# Patient Record
Sex: FEMALE | Race: WHITE | NOT HISPANIC OR LATINO | Employment: FULL TIME | URBAN - METROPOLITAN AREA
[De-identification: names, ages, dates, MRNs, and addresses within clinical notes are randomized per-mention and may not be internally consistent; named-entity substitution may affect disease eponyms.]

---

## 2023-05-10 ENCOUNTER — OFFICE VISIT (OUTPATIENT)
Dept: PRIMARY CARE | Facility: CLINIC | Age: 43
End: 2023-05-10
Payer: COMMERCIAL

## 2023-05-10 ENCOUNTER — LAB (OUTPATIENT)
Dept: LAB | Facility: LAB | Age: 43
End: 2023-05-10
Payer: COMMERCIAL

## 2023-05-10 VITALS
BODY MASS INDEX: 29.06 KG/M2 | WEIGHT: 203 LBS | SYSTOLIC BLOOD PRESSURE: 122 MMHG | OXYGEN SATURATION: 96 % | HEIGHT: 70 IN | HEART RATE: 73 BPM | DIASTOLIC BLOOD PRESSURE: 86 MMHG

## 2023-05-10 DIAGNOSIS — F32.89 OTHER DEPRESSION: ICD-10-CM

## 2023-05-10 DIAGNOSIS — F32.89 OTHER DEPRESSION: Primary | ICD-10-CM

## 2023-05-10 DIAGNOSIS — Z13.6 SCREENING FOR CARDIOVASCULAR CONDITION: ICD-10-CM

## 2023-05-10 DIAGNOSIS — N32.81 OVERACTIVE BLADDER: ICD-10-CM

## 2023-05-10 DIAGNOSIS — K21.00 GASTROESOPHAGEAL REFLUX DISEASE WITH ESOPHAGITIS WITHOUT HEMORRHAGE: ICD-10-CM

## 2023-05-10 DIAGNOSIS — N32.81 OAB (OVERACTIVE BLADDER): ICD-10-CM

## 2023-05-10 PROBLEM — G43.909 HEADACHE, MIGRAINE: Status: ACTIVE | Noted: 2023-05-10

## 2023-05-10 PROBLEM — E03.9 HYPOTHYROIDISM: Status: ACTIVE | Noted: 2023-05-10

## 2023-05-10 PROBLEM — S93.401A SPRAIN OF ANKLE, RIGHT: Status: RESOLVED | Noted: 2023-05-10 | Resolved: 2023-05-10

## 2023-05-10 PROBLEM — G89.29 CHRONIC LOW BACK PAIN: Status: RESOLVED | Noted: 2023-05-10 | Resolved: 2023-05-10

## 2023-05-10 PROBLEM — A60.00 RECURRENT GENITAL HERPES: Status: ACTIVE | Noted: 2023-05-10

## 2023-05-10 PROBLEM — E66.811 OBESITY (BMI 30.0-34.9): Status: RESOLVED | Noted: 2023-05-10 | Resolved: 2023-05-10

## 2023-05-10 PROBLEM — M62.08 RECTUS DIASTASIS: Status: ACTIVE | Noted: 2023-05-10

## 2023-05-10 PROBLEM — R35.0 URINARY FREQUENCY: Status: RESOLVED | Noted: 2023-05-10 | Resolved: 2023-05-10

## 2023-05-10 PROBLEM — M79.671 PAIN IN BOTH FEET: Status: RESOLVED | Noted: 2023-05-10 | Resolved: 2023-05-10

## 2023-05-10 PROBLEM — J01.90 ACUTE SINUSITIS: Status: RESOLVED | Noted: 2023-05-10 | Resolved: 2023-05-10

## 2023-05-10 PROBLEM — M79.672 PAIN IN BOTH FEET: Status: RESOLVED | Noted: 2023-05-10 | Resolved: 2023-05-10

## 2023-05-10 PROBLEM — M54.50 CHRONIC LOW BACK PAIN: Status: RESOLVED | Noted: 2023-05-10 | Resolved: 2023-05-10

## 2023-05-10 PROBLEM — R19.7 DIARRHEA: Status: RESOLVED | Noted: 2023-05-10 | Resolved: 2023-05-10

## 2023-05-10 PROBLEM — K42.9 UMBILICAL HERNIA: Status: RESOLVED | Noted: 2023-05-10 | Resolved: 2023-05-10

## 2023-05-10 PROBLEM — E66.9 OBESITY (BMI 30.0-34.9): Status: RESOLVED | Noted: 2023-05-10 | Resolved: 2023-05-10

## 2023-05-10 PROBLEM — G56.03 BILATERAL CARPAL TUNNEL SYNDROME: Status: RESOLVED | Noted: 2023-05-10 | Resolved: 2023-05-10

## 2023-05-10 PROBLEM — M72.2 PLANTAR FASCIITIS, BILATERAL: Status: RESOLVED | Noted: 2023-05-10 | Resolved: 2023-05-10

## 2023-05-10 PROBLEM — J30.9 ALLERGIC RHINITIS: Status: RESOLVED | Noted: 2023-05-10 | Resolved: 2023-05-10

## 2023-05-10 PROBLEM — K21.9 GERD (GASTROESOPHAGEAL REFLUX DISEASE): Status: ACTIVE | Noted: 2023-05-10

## 2023-05-10 PROBLEM — F32.A DEPRESSION: Status: ACTIVE | Noted: 2023-05-10

## 2023-05-10 LAB
ALANINE AMINOTRANSFERASE (SGPT) (U/L) IN SER/PLAS: 35 U/L (ref 7–45)
ALBUMIN (G/DL) IN SER/PLAS: 4 G/DL (ref 3.4–5)
ALKALINE PHOSPHATASE (U/L) IN SER/PLAS: 72 U/L (ref 33–110)
ANION GAP IN SER/PLAS: 14 MMOL/L (ref 10–20)
ASPARTATE AMINOTRANSFERASE (SGOT) (U/L) IN SER/PLAS: 26 U/L (ref 9–39)
BILIRUBIN TOTAL (MG/DL) IN SER/PLAS: 0.5 MG/DL (ref 0–1.2)
CALCIUM (MG/DL) IN SER/PLAS: 9.3 MG/DL (ref 8.6–10.3)
CARBON DIOXIDE, TOTAL (MMOL/L) IN SER/PLAS: 26 MMOL/L (ref 21–32)
CHLORIDE (MMOL/L) IN SER/PLAS: 102 MMOL/L (ref 98–107)
CHOLESTEROL (MG/DL) IN SER/PLAS: 175 MG/DL (ref 0–199)
CHOLESTEROL IN HDL (MG/DL) IN SER/PLAS: 55.3 MG/DL
CHOLESTEROL/HDL RATIO: 3.2
CREATININE (MG/DL) IN SER/PLAS: 0.78 MG/DL (ref 0.5–1.05)
ERYTHROCYTE DISTRIBUTION WIDTH (RATIO) BY AUTOMATED COUNT: 13.7 % (ref 11.5–14.5)
ERYTHROCYTE MEAN CORPUSCULAR HEMOGLOBIN CONCENTRATION (G/DL) BY AUTOMATED: 33 G/DL (ref 32–36)
ERYTHROCYTE MEAN CORPUSCULAR VOLUME (FL) BY AUTOMATED COUNT: 86 FL (ref 80–100)
ERYTHROCYTES (10*6/UL) IN BLOOD BY AUTOMATED COUNT: 5.25 X10E12/L (ref 4–5.2)
GFR FEMALE: >90 ML/MIN/1.73M2
GLUCOSE (MG/DL) IN SER/PLAS: 82 MG/DL (ref 74–99)
HEMATOCRIT (%) IN BLOOD BY AUTOMATED COUNT: 45.2 % (ref 36–46)
HEMOGLOBIN (G/DL) IN BLOOD: 14.9 G/DL (ref 12–16)
LDL: 92 MG/DL (ref 0–99)
LEUKOCYTES (10*3/UL) IN BLOOD BY AUTOMATED COUNT: 8.2 X10E9/L (ref 4.4–11.3)
PLATELETS (10*3/UL) IN BLOOD AUTOMATED COUNT: 259 X10E9/L (ref 150–450)
POTASSIUM (MMOL/L) IN SER/PLAS: 4.3 MMOL/L (ref 3.5–5.3)
PROTEIN TOTAL: 6.6 G/DL (ref 6.4–8.2)
SODIUM (MMOL/L) IN SER/PLAS: 138 MMOL/L (ref 136–145)
THYROTROPIN (MIU/L) IN SER/PLAS BY DETECTION LIMIT <= 0.05 MIU/L: 1.61 MIU/L (ref 0.44–3.98)
TRIGLYCERIDE (MG/DL) IN SER/PLAS: 137 MG/DL (ref 0–149)
UREA NITROGEN (MG/DL) IN SER/PLAS: 8 MG/DL (ref 6–23)
VLDL: 27 MG/DL (ref 0–40)

## 2023-05-10 PROCEDURE — 82306 VITAMIN D 25 HYDROXY: CPT

## 2023-05-10 PROCEDURE — 84443 ASSAY THYROID STIM HORMONE: CPT

## 2023-05-10 PROCEDURE — 82607 VITAMIN B-12: CPT

## 2023-05-10 PROCEDURE — 80053 COMPREHEN METABOLIC PANEL: CPT

## 2023-05-10 PROCEDURE — 80061 LIPID PANEL: CPT

## 2023-05-10 PROCEDURE — 1036F TOBACCO NON-USER: CPT | Performed by: NURSE PRACTITIONER

## 2023-05-10 PROCEDURE — 85027 COMPLETE CBC AUTOMATED: CPT

## 2023-05-10 PROCEDURE — 99214 OFFICE O/P EST MOD 30 MIN: CPT | Performed by: NURSE PRACTITIONER

## 2023-05-10 PROCEDURE — 36415 COLL VENOUS BLD VENIPUNCTURE: CPT

## 2023-05-10 RX ORDER — PANTOPRAZOLE SODIUM 40 MG/1
1 TABLET, DELAYED RELEASE ORAL DAILY
COMMUNITY
Start: 2020-12-17 | End: 2023-05-10 | Stop reason: SDUPTHER

## 2023-05-10 RX ORDER — CITALOPRAM 40 MG/1
1 TABLET, FILM COATED ORAL DAILY
COMMUNITY
Start: 2021-11-29 | End: 2023-05-10 | Stop reason: SDDI

## 2023-05-10 RX ORDER — CITALOPRAM 10 MG/1
10 TABLET ORAL DAILY
Qty: 30 TABLET | Refills: 1 | Status: SHIPPED | OUTPATIENT
Start: 2023-05-10 | End: 2023-08-03 | Stop reason: SDUPTHER

## 2023-05-10 RX ORDER — OXYBUTYNIN CHLORIDE 10 MG/1
1 TABLET, EXTENDED RELEASE ORAL DAILY
COMMUNITY
Start: 2020-09-14 | End: 2023-05-10 | Stop reason: SDUPTHER

## 2023-05-10 RX ORDER — OXYBUTYNIN CHLORIDE 10 MG/1
10 TABLET, EXTENDED RELEASE ORAL DAILY
Qty: 90 TABLET | Refills: 3 | Status: SHIPPED | OUTPATIENT
Start: 2023-05-10 | End: 2023-11-15 | Stop reason: SDUPTHER

## 2023-05-10 RX ORDER — LEVOTHYROXINE SODIUM 50 UG/1
1 TABLET ORAL DAILY
COMMUNITY
Start: 2018-01-16

## 2023-05-10 RX ORDER — ALBUTEROL SULFATE 90 UG/1
2 AEROSOL, METERED RESPIRATORY (INHALATION) EVERY 6 HOURS PRN
COMMUNITY
Start: 2020-05-04

## 2023-05-10 RX ORDER — FLUTICASONE PROPIONATE 50 MCG
SPRAY, SUSPENSION (ML) NASAL 2 TIMES DAILY
COMMUNITY
Start: 2021-09-08

## 2023-05-10 RX ORDER — PANTOPRAZOLE SODIUM 20 MG/1
20 TABLET, DELAYED RELEASE ORAL DAILY
Qty: 90 TABLET | Refills: 3 | Status: SHIPPED | OUTPATIENT
Start: 2023-05-10 | End: 2024-05-09

## 2023-05-10 ASSESSMENT — PATIENT HEALTH QUESTIONNAIRE - PHQ9
2. FEELING DOWN, DEPRESSED OR HOPELESS: NOT AT ALL
SUM OF ALL RESPONSES TO PHQ9 QUESTIONS 1 AND 2: 0
1. LITTLE INTEREST OR PLEASURE IN DOING THINGS: NOT AT ALL

## 2023-05-10 NOTE — ASSESSMENT & PLAN NOTE
Improved but would like to start low dose of citalopram   Citalopram 10 mg  Cbc,cmp, lipid, vitamin d, vitamin b

## 2023-05-10 NOTE — PATIENT INSTRUCTIONS
I refilled your medications  We will start the celexa at a lower dose - if you want to increase after 6 weeks let me know  I ordered an EGD to check your stomach and esophagus

## 2023-05-10 NOTE — PROGRESS NOTES
"Subjective   Autumn Hyde is a 42 y.o. female who presents for Med Refill (Patient has been out of all medication x 6 months/Patient chokes when eating if not eating mints after eating).    Presents to follow up with medication refills    She has been out of medication x 6 months    She felt her depression did improve but would like to go back on citalopram at a lower dose    GERD has been greatly worse.  Feels like she is choking when she eats or drinks.  Noticed prior to stopping pantoprazole but now has gotten worse.  Has had EGD in the past with Dr. Sánchez - not sure when it was         ROS was completed and all systems are negative with the exception of what was noted in the the HPI.     Objective     Ht 1.778 m (5' 10\")   Wt 92.1 kg (203 lb)   BMI 29.13 kg/m²      Physical Exam  Vitals reviewed.   Constitutional:       Appearance: Normal appearance.   HENT:      Head: Normocephalic and atraumatic.      Right Ear: External ear normal.      Left Ear: External ear normal.      Nose: Nose normal.      Mouth/Throat:      Mouth: Mucous membranes are moist.   Eyes:      Extraocular Movements: Extraocular movements intact.      Conjunctiva/sclera: Conjunctivae normal.      Pupils: Pupils are equal, round, and reactive to light.   Cardiovascular:      Rate and Rhythm: Normal rate and regular rhythm.      Pulses: Normal pulses.      Heart sounds: Normal heart sounds.   Pulmonary:      Effort: Pulmonary effort is normal.      Breath sounds: Normal breath sounds.   Abdominal:      General: Bowel sounds are normal. There is no distension.      Palpations: Abdomen is soft.   Musculoskeletal:      Right lower leg: No edema.      Left lower leg: No edema.   Skin:     General: Skin is warm.      Capillary Refill: Capillary refill takes less than 2 seconds.   Neurological:      General: No focal deficit present.      Mental Status: She is alert and oriented to person, place, and time. Mental status is at baseline. "   Psychiatric:         Mood and Affect: Mood normal.         Behavior: Behavior normal.         Thought Content: Thought content normal.         Judgment: Judgment normal.         Problem List Items Addressed This Visit          Digestive    GERD (gastroesophageal reflux disease)    Overview     Last EGD 2020  GERD worse  Has been out of pantoprazole 6 months  Start pantoprazole 20 mg daily  EGD ordered          Current Assessment & Plan     Last EGD 2020  GERD worse  Has been out of pantoprazole 6 months  Start pantoprazole 20 mg daily  EGD ordered          Relevant Medications    pantoprazole (ProtoNix) 20 mg EC tablet    Other Relevant Orders    EGD       Genitourinary    OAB (overactive bladder)    Overview     Refilled oxybutynin          Current Assessment & Plan     Refilled oxybutynin          Relevant Medications    oxybutynin XL (Ditropan-XL) 10 mg 24 hr tablet       Other    Depression - Primary    Overview     Improved but would like to start low dose of citalopram   Citalopram 10 mg  Cbc,cmp, lipid, vitamin d, vitamin b         Current Assessment & Plan     Improved but would like to start low dose of citalopram   Citalopram 10 mg  Cbc,cmp, lipid, vitamin d, vitamin b         Relevant Medications    citalopram (CeleXA) 10 mg tablet    Other Relevant Orders    CBC    Comprehensive Metabolic Panel    TSH with reflex to Free T4 if abnormal    Vitamin B12    Vitamin D, Total     Other Visit Diagnoses       Screening for cardiovascular condition        Relevant Orders    Lipid Panel          .

## 2023-05-10 NOTE — ASSESSMENT & PLAN NOTE
Last EGD 2020  GERD worse  Has been out of pantoprazole 6 months  Start pantoprazole 20 mg daily  EGD ordered

## 2023-05-11 LAB
CALCIDIOL (25 OH VITAMIN D3) (NG/ML) IN SER/PLAS: 25 NG/ML
COBALAMIN (VITAMIN B12) (PG/ML) IN SER/PLAS: 277 PG/ML (ref 211–911)

## 2023-06-25 LAB — URINE CULTURE: NORMAL

## 2023-08-03 DIAGNOSIS — F32.89 OTHER DEPRESSION: ICD-10-CM

## 2023-08-03 RX ORDER — CITALOPRAM 10 MG/1
10 TABLET ORAL DAILY
Qty: 30 TABLET | Refills: 1 | Status: SHIPPED | OUTPATIENT
Start: 2023-08-03 | End: 2023-11-20

## 2023-08-16 ENCOUNTER — HOSPITAL ENCOUNTER (OUTPATIENT)
Dept: DATA CONVERSION | Facility: HOSPITAL | Age: 43
End: 2023-08-16
Attending: INTERNAL MEDICINE | Admitting: INTERNAL MEDICINE
Payer: COMMERCIAL

## 2023-08-16 DIAGNOSIS — K44.9 DIAPHRAGMATIC HERNIA WITHOUT OBSTRUCTION OR GANGRENE: ICD-10-CM

## 2023-08-16 DIAGNOSIS — R13.10 DYSPHAGIA, UNSPECIFIED: ICD-10-CM

## 2023-08-16 DIAGNOSIS — K21.00 GASTRO-ESOPHAGEAL REFLUX DISEASE WITH ESOPHAGITIS, WITHOUT BLEEDING: ICD-10-CM

## 2023-08-16 LAB — HCG, URINE: NORMAL

## 2023-08-18 LAB
COMPLETE PATHOLOGY REPORT: NORMAL
CONVERTED CLINICAL DIAGNOSIS-HISTORY: NORMAL
CONVERTED FINAL DIAGNOSIS: NORMAL
CONVERTED FINAL REPORT PDF LINK TO COPY AND PASTE: NORMAL
CONVERTED GROSS DESCRIPTION: NORMAL

## 2023-09-29 VITALS
SYSTOLIC BLOOD PRESSURE: 129 MMHG | TEMPERATURE: 96.8 F | DIASTOLIC BLOOD PRESSURE: 80 MMHG | HEART RATE: 57 BPM | RESPIRATION RATE: 16 BRPM

## 2023-09-30 NOTE — H&P
History of Present Illness:   Pregnant/Lactating:  ·  Are You Pregnant no   ·  Are You Currently Breastfeeding no     History Present Illness:  Reason for surgery: Dysphagia, GERD   HPI:    Patient was evaluated in GI clinic due to GERD, dysphagia.  Previous EGD  in 2020 by Dr. Singh grade A reflux esophagitis, mild gastritis.    Allergies:        Allergies:  ·  No Known Allergies :     Home Medication Review:   Home Medications Reviewed: yes     Impression/Procedure:   ·  Impression and Planned Procedure: EGD with biopsy       ERAS (Enhanced Recovery After Surgery):  ·  ERAS Patient: no       Vital Signs:  Temperature C: 36 degrees C   Temperature F: 96.8 degrees F   Heart Rate: 57 beats per minute   Respiratory Rate: 16 breath per minute   Blood Pressure Systolic: 129 mm/Hg   Blood Pressure Diastolic: 80 mm/Hg     Physical Exam by System:    Respiratory/Thorax: Patent airways, CTAB, normal  breath sounds with good chest expansion, thorax symmetric   Cardiovascular: Regular, rate and rhythm, no murmurs,  2+ equal pulses of the extremities, normal S 1and S 2     Consent:   COVID-19 Consent:  ·  COVID-19 Risk Consent Surgeon has reviewed key risks related to the risk of deniz COVID-19 and if they contract COVID-19 what the risks are.       Electronic Signatures:  Sylvester Hewitt)  (Signed 16-Aug-2023 08:15)   Authored: History of Present Illness, Allergies, Home  Medication Review, Impression/Procedure, ERAS, Physical Exam, Consent, Note Completion      Last Updated: 16-Aug-2023 08:15 by Sylvester Hewitt)

## 2023-10-30 ENCOUNTER — PREP FOR PROCEDURE (OUTPATIENT)
Dept: ORTHOPEDIC SURGERY | Facility: CLINIC | Age: 43
End: 2023-10-30
Payer: COMMERCIAL

## 2023-10-30 DIAGNOSIS — G56.02 CARPAL TUNNEL SYNDROME ON LEFT: Primary | ICD-10-CM

## 2023-10-30 NOTE — H&P
Patient doing well following right open carpal tunnel release.  Would like to proceed with left carpal tunnel release, as previously discussed.

## 2023-10-31 ENCOUNTER — TELEPHONE (OUTPATIENT)
Dept: ORTHOPEDIC SURGERY | Facility: CLINIC | Age: 43
End: 2023-10-31
Payer: COMMERCIAL

## 2023-11-03 PROBLEM — G56.02 CARPAL TUNNEL SYNDROME ON LEFT: Status: ACTIVE | Noted: 2023-10-30

## 2023-11-15 DIAGNOSIS — N32.81 OAB (OVERACTIVE BLADDER): Primary | ICD-10-CM

## 2023-11-15 RX ORDER — OXYBUTYNIN CHLORIDE 10 MG/1
10 TABLET, EXTENDED RELEASE ORAL DAILY
Qty: 90 TABLET | Refills: 0 | Status: SHIPPED | OUTPATIENT
Start: 2023-11-15 | End: 2024-04-02 | Stop reason: SDUPTHER

## 2023-11-17 DIAGNOSIS — F32.89 OTHER DEPRESSION: ICD-10-CM

## 2023-11-20 RX ORDER — CITALOPRAM 10 MG/1
10 TABLET ORAL DAILY
Qty: 30 TABLET | Refills: 0 | Status: SHIPPED | OUTPATIENT
Start: 2023-11-20

## 2023-11-30 DIAGNOSIS — J04.0 ACUTE LARYNGITIS: ICD-10-CM

## 2023-11-30 DIAGNOSIS — N39.0 RECURRENT URINARY TRACT INFECTION: Primary | ICD-10-CM

## 2023-11-30 RX ORDER — AMOXICILLIN AND CLAVULANATE POTASSIUM 875; 125 MG/1; MG/1
1 TABLET, FILM COATED ORAL 2 TIMES DAILY
Qty: 14 TABLET | Refills: 0 | Status: SHIPPED | OUTPATIENT
Start: 2023-11-30 | End: 2023-12-07

## 2023-11-30 RX ORDER — METHYLPREDNISOLONE 4 MG/1
TABLET ORAL
Qty: 21 TABLET | Refills: 0 | Status: SHIPPED | OUTPATIENT
Start: 2023-11-30 | End: 2023-12-07

## 2023-12-08 ENCOUNTER — TELEPHONE (OUTPATIENT)
Dept: UROLOGY | Facility: CLINIC | Age: 43
End: 2023-12-08
Payer: COMMERCIAL

## 2023-12-08 DIAGNOSIS — J06.9 UPPER RESPIRATORY TRACT INFECTION, UNSPECIFIED TYPE: Primary | ICD-10-CM

## 2023-12-08 RX ORDER — AMOXICILLIN AND CLAVULANATE POTASSIUM 600; 42.9 MG/5ML; MG/5ML
POWDER, FOR SUSPENSION ORAL
Qty: 93.8 ML | Refills: 0 | Status: SHIPPED | OUTPATIENT
Start: 2023-12-08

## 2023-12-08 NOTE — TELEPHONE ENCOUNTER
Pharmacy is calling in wanting to confirm if this is supposed to be in liquid form or did you want it to be a tablet for Amoxicillin- pot clavulanate? Asking for clarification on this .

## 2023-12-14 ENCOUNTER — ANESTHESIA EVENT (OUTPATIENT)
Dept: OPERATING ROOM | Facility: HOSPITAL | Age: 43
End: 2023-12-14
Payer: COMMERCIAL

## 2023-12-18 ENCOUNTER — ANESTHESIA (OUTPATIENT)
Dept: OPERATING ROOM | Facility: HOSPITAL | Age: 43
End: 2023-12-18
Payer: COMMERCIAL

## 2023-12-18 ENCOUNTER — HOSPITAL ENCOUNTER (OUTPATIENT)
Facility: HOSPITAL | Age: 43
Setting detail: OUTPATIENT SURGERY
Discharge: HOME | End: 2023-12-18
Attending: STUDENT IN AN ORGANIZED HEALTH CARE EDUCATION/TRAINING PROGRAM | Admitting: STUDENT IN AN ORGANIZED HEALTH CARE EDUCATION/TRAINING PROGRAM
Payer: COMMERCIAL

## 2023-12-18 VITALS
RESPIRATION RATE: 10 BRPM | OXYGEN SATURATION: 100 % | DIASTOLIC BLOOD PRESSURE: 61 MMHG | TEMPERATURE: 96.8 F | HEART RATE: 67 BPM | SYSTOLIC BLOOD PRESSURE: 122 MMHG

## 2023-12-18 DIAGNOSIS — Z98.890: Primary | ICD-10-CM

## 2023-12-18 LAB — PREGNANCY TEST URINE, POC: NEGATIVE

## 2023-12-18 PROCEDURE — 57288 REPAIR BLADDER DEFECT: CPT | Performed by: STUDENT IN AN ORGANIZED HEALTH CARE EDUCATION/TRAINING PROGRAM

## 2023-12-18 PROCEDURE — 2500000005 HC RX 250 GENERAL PHARMACY W/O HCPCS: Performed by: NURSE ANESTHETIST, CERTIFIED REGISTERED

## 2023-12-18 PROCEDURE — 7100000009 HC PHASE TWO TIME - INITIAL BASE CHARGE: Performed by: STUDENT IN AN ORGANIZED HEALTH CARE EDUCATION/TRAINING PROGRAM

## 2023-12-18 PROCEDURE — 2500000004 HC RX 250 GENERAL PHARMACY W/ HCPCS (ALT 636 FOR OP/ED): Performed by: NURSE ANESTHETIST, CERTIFIED REGISTERED

## 2023-12-18 PROCEDURE — 2500000004 HC RX 250 GENERAL PHARMACY W/ HCPCS (ALT 636 FOR OP/ED): Performed by: ANESTHESIOLOGY

## 2023-12-18 PROCEDURE — A57288 PR SLING OPER STRES INCONTINENCE: Performed by: NURSE ANESTHETIST, CERTIFIED REGISTERED

## 2023-12-18 PROCEDURE — 7100000001 HC RECOVERY ROOM TIME - INITIAL BASE CHARGE: Performed by: STUDENT IN AN ORGANIZED HEALTH CARE EDUCATION/TRAINING PROGRAM

## 2023-12-18 PROCEDURE — 2780000003 HC OR 278 NO HCPCS: Performed by: STUDENT IN AN ORGANIZED HEALTH CARE EDUCATION/TRAINING PROGRAM

## 2023-12-18 PROCEDURE — 2500000005 HC RX 250 GENERAL PHARMACY W/O HCPCS: Performed by: STUDENT IN AN ORGANIZED HEALTH CARE EDUCATION/TRAINING PROGRAM

## 2023-12-18 PROCEDURE — 3700000001 HC GENERAL ANESTHESIA TIME - INITIAL BASE CHARGE: Performed by: STUDENT IN AN ORGANIZED HEALTH CARE EDUCATION/TRAINING PROGRAM

## 2023-12-18 PROCEDURE — 3600000008 HC OR TIME - EACH INCREMENTAL 1 MINUTE - PROCEDURE LEVEL THREE: Performed by: STUDENT IN AN ORGANIZED HEALTH CARE EDUCATION/TRAINING PROGRAM

## 2023-12-18 PROCEDURE — 7100000010 HC PHASE TWO TIME - EACH INCREMENTAL 1 MINUTE: Performed by: STUDENT IN AN ORGANIZED HEALTH CARE EDUCATION/TRAINING PROGRAM

## 2023-12-18 PROCEDURE — 7100000002 HC RECOVERY ROOM TIME - EACH INCREMENTAL 1 MINUTE: Performed by: STUDENT IN AN ORGANIZED HEALTH CARE EDUCATION/TRAINING PROGRAM

## 2023-12-18 PROCEDURE — 3700000002 HC GENERAL ANESTHESIA TIME - EACH INCREMENTAL 1 MINUTE: Performed by: STUDENT IN AN ORGANIZED HEALTH CARE EDUCATION/TRAINING PROGRAM

## 2023-12-18 PROCEDURE — 2720000007 HC OR 272 NO HCPCS: Performed by: STUDENT IN AN ORGANIZED HEALTH CARE EDUCATION/TRAINING PROGRAM

## 2023-12-18 PROCEDURE — C1771 REP DEV, URINARY, W/SLING: HCPCS | Performed by: STUDENT IN AN ORGANIZED HEALTH CARE EDUCATION/TRAINING PROGRAM

## 2023-12-18 PROCEDURE — 3600000003 HC OR TIME - INITIAL BASE CHARGE - PROCEDURE LEVEL THREE: Performed by: STUDENT IN AN ORGANIZED HEALTH CARE EDUCATION/TRAINING PROGRAM

## 2023-12-18 PROCEDURE — 81025 URINE PREGNANCY TEST: CPT | Performed by: ANESTHESIOLOGY

## 2023-12-18 PROCEDURE — 2500000004 HC RX 250 GENERAL PHARMACY W/ HCPCS (ALT 636 FOR OP/ED): Performed by: STUDENT IN AN ORGANIZED HEALTH CARE EDUCATION/TRAINING PROGRAM

## 2023-12-18 PROCEDURE — A4217 STERILE WATER/SALINE, 500 ML: HCPCS | Performed by: STUDENT IN AN ORGANIZED HEALTH CARE EDUCATION/TRAINING PROGRAM

## 2023-12-18 RX ORDER — MIDAZOLAM HYDROCHLORIDE 1 MG/ML
INJECTION INTRAMUSCULAR; INTRAVENOUS AS NEEDED
Status: DISCONTINUED | OUTPATIENT
Start: 2023-12-18 | End: 2023-12-18

## 2023-12-18 RX ORDER — SENNOSIDES 8.6 MG/1
1 TABLET ORAL DAILY
Qty: 30 TABLET | Refills: 0 | Status: SHIPPED | OUTPATIENT
Start: 2023-12-18 | End: 2024-01-17

## 2023-12-18 RX ORDER — DEXAMETHASONE SODIUM PHOSPHATE 4 MG/ML
INJECTION, SOLUTION INTRA-ARTICULAR; INTRALESIONAL; INTRAMUSCULAR; INTRAVENOUS; SOFT TISSUE AS NEEDED
Status: DISCONTINUED | OUTPATIENT
Start: 2023-12-18 | End: 2023-12-18

## 2023-12-18 RX ORDER — DROPERIDOL 2.5 MG/ML
0.62 INJECTION, SOLUTION INTRAMUSCULAR; INTRAVENOUS ONCE AS NEEDED
Status: DISCONTINUED | OUTPATIENT
Start: 2023-12-18 | End: 2023-12-18 | Stop reason: HOSPADM

## 2023-12-18 RX ORDER — ONDANSETRON HYDROCHLORIDE 2 MG/ML
4 INJECTION, SOLUTION INTRAVENOUS ONCE AS NEEDED
Status: DISCONTINUED | OUTPATIENT
Start: 2023-12-18 | End: 2023-12-18 | Stop reason: HOSPADM

## 2023-12-18 RX ORDER — POLYETHYLENE GLYCOL 3350 17 G/17G
17 POWDER, FOR SOLUTION ORAL DAILY
Qty: 30 PACKET | Refills: 0 | Status: SHIPPED | OUTPATIENT
Start: 2023-12-18 | End: 2024-01-17

## 2023-12-18 RX ORDER — TRAMADOL HYDROCHLORIDE 50 MG/1
50 TABLET ORAL EVERY 6 HOURS PRN
Qty: 15 TABLET | Refills: 0 | Status: SHIPPED | OUTPATIENT
Start: 2023-12-18

## 2023-12-18 RX ORDER — SODIUM CHLORIDE, SODIUM LACTATE, POTASSIUM CHLORIDE, CALCIUM CHLORIDE 600; 310; 30; 20 MG/100ML; MG/100ML; MG/100ML; MG/100ML
100 INJECTION, SOLUTION INTRAVENOUS CONTINUOUS
Status: DISCONTINUED | OUTPATIENT
Start: 2023-12-18 | End: 2023-12-18 | Stop reason: HOSPADM

## 2023-12-18 RX ORDER — CITALOPRAM 40 MG/1
40 TABLET, FILM COATED ORAL DAILY
Status: ON HOLD | COMMUNITY
Start: 2021-11-29 | End: 2023-12-18 | Stop reason: ALTCHOICE

## 2023-12-18 RX ORDER — OXYBUTYNIN CHLORIDE 10 MG/1
10 TABLET, EXTENDED RELEASE ORAL DAILY
Status: ON HOLD | COMMUNITY
Start: 2020-09-14 | End: 2023-12-18 | Stop reason: ALTCHOICE

## 2023-12-18 RX ORDER — FENTANYL CITRATE 50 UG/ML
INJECTION, SOLUTION INTRAMUSCULAR; INTRAVENOUS AS NEEDED
Status: DISCONTINUED | OUTPATIENT
Start: 2023-12-18 | End: 2023-12-18

## 2023-12-18 RX ORDER — CEFAZOLIN SODIUM 2 G/100ML
2 INJECTION, SOLUTION INTRAVENOUS ONCE
Status: DISCONTINUED | OUTPATIENT
Start: 2023-12-18 | End: 2023-12-18 | Stop reason: HOSPADM

## 2023-12-18 RX ORDER — PROPOFOL 10 MG/ML
INJECTION, EMULSION INTRAVENOUS AS NEEDED
Status: DISCONTINUED | OUTPATIENT
Start: 2023-12-18 | End: 2023-12-18

## 2023-12-18 RX ORDER — CEFAZOLIN 1 G/1
INJECTION, POWDER, FOR SOLUTION INTRAVENOUS AS NEEDED
Status: DISCONTINUED | OUTPATIENT
Start: 2023-12-18 | End: 2023-12-18

## 2023-12-18 RX ORDER — OXYCODONE HYDROCHLORIDE 5 MG/1
5 TABLET ORAL EVERY 4 HOURS PRN
Status: DISCONTINUED | OUTPATIENT
Start: 2023-12-18 | End: 2023-12-18 | Stop reason: HOSPADM

## 2023-12-18 RX ORDER — ONDANSETRON HYDROCHLORIDE 2 MG/ML
INJECTION, SOLUTION INTRAVENOUS AS NEEDED
Status: DISCONTINUED | OUTPATIENT
Start: 2023-12-18 | End: 2023-12-18

## 2023-12-18 RX ORDER — ACETAMINOPHEN 500 MG
1000 TABLET ORAL EVERY 6 HOURS PRN
Qty: 30 TABLET | Refills: 0 | Status: SHIPPED | OUTPATIENT
Start: 2023-12-18 | End: 2023-12-25

## 2023-12-18 RX ORDER — KETOROLAC TROMETHAMINE 10 MG/1
10 TABLET, FILM COATED ORAL EVERY 6 HOURS PRN
Qty: 20 TABLET | Refills: 0 | Status: SHIPPED | OUTPATIENT
Start: 2023-12-18 | End: 2023-12-25

## 2023-12-18 RX ORDER — LIDOCAINE HYDROCHLORIDE AND EPINEPHRINE 10; 10 MG/ML; UG/ML
INJECTION, SOLUTION INFILTRATION; PERINEURAL AS NEEDED
Status: DISCONTINUED | OUTPATIENT
Start: 2023-12-18 | End: 2023-12-18 | Stop reason: HOSPADM

## 2023-12-18 RX ORDER — LIDOCAINE HCL/PF 100 MG/5ML
SYRINGE (ML) INTRAVENOUS AS NEEDED
Status: DISCONTINUED | OUTPATIENT
Start: 2023-12-18 | End: 2023-12-18

## 2023-12-18 RX ORDER — KETOROLAC TROMETHAMINE 30 MG/ML
INJECTION, SOLUTION INTRAMUSCULAR; INTRAVENOUS AS NEEDED
Status: DISCONTINUED | OUTPATIENT
Start: 2023-12-18 | End: 2023-12-18

## 2023-12-18 RX ORDER — VIBEGRON 75 MG/1
75 TABLET, FILM COATED ORAL DAILY
COMMUNITY
Start: 2023-07-17

## 2023-12-18 RX ORDER — SODIUM CHLORIDE 0.9 G/100ML
IRRIGANT IRRIGATION AS NEEDED
Status: DISCONTINUED | OUTPATIENT
Start: 2023-12-18 | End: 2023-12-18 | Stop reason: HOSPADM

## 2023-12-18 RX ADMIN — ONDANSETRON 4 MG: 2 INJECTION INTRAMUSCULAR; INTRAVENOUS at 16:16

## 2023-12-18 RX ADMIN — DEXAMETHASONE SODIUM PHOSPHATE 4 MG: 4 INJECTION INTRA-ARTICULAR; INTRALESIONAL; INTRAMUSCULAR; INTRAVENOUS; SOFT TISSUE at 16:16

## 2023-12-18 RX ADMIN — MIDAZOLAM HYDROCHLORIDE 2 MG: 1 INJECTION, SOLUTION INTRAMUSCULAR; INTRAVENOUS at 15:45

## 2023-12-18 RX ADMIN — SODIUM CHLORIDE, POTASSIUM CHLORIDE, SODIUM LACTATE AND CALCIUM CHLORIDE: 600; 310; 30; 20 INJECTION, SOLUTION INTRAVENOUS at 15:49

## 2023-12-18 RX ADMIN — CEFAZOLIN 2 G: 330 INJECTION, POWDER, FOR SOLUTION INTRAMUSCULAR; INTRAVENOUS at 15:54

## 2023-12-18 RX ADMIN — FENTANYL CITRATE 100 MCG: 50 INJECTION, SOLUTION INTRAMUSCULAR; INTRAVENOUS at 15:56

## 2023-12-18 RX ADMIN — PROPOFOL 200 MG: 10 INJECTION, EMULSION INTRAVENOUS at 15:49

## 2023-12-18 RX ADMIN — KETOROLAC TROMETHAMINE 30 MG: 30 INJECTION, SOLUTION INTRAMUSCULAR at 16:16

## 2023-12-18 RX ADMIN — LIDOCAINE HYDROCHLORIDE 50 MG: 20 INJECTION INTRAVENOUS at 15:49

## 2023-12-18 SDOH — HEALTH STABILITY: MENTAL HEALTH: CURRENT SMOKER: 0

## 2023-12-18 ASSESSMENT — COLUMBIA-SUICIDE SEVERITY RATING SCALE - C-SSRS
6. HAVE YOU EVER DONE ANYTHING, STARTED TO DO ANYTHING, OR PREPARED TO DO ANYTHING TO END YOUR LIFE?: NO
2. HAVE YOU ACTUALLY HAD ANY THOUGHTS OF KILLING YOURSELF?: NO
1. IN THE PAST MONTH, HAVE YOU WISHED YOU WERE DEAD OR WISHED YOU COULD GO TO SLEEP AND NOT WAKE UP?: NO

## 2023-12-18 ASSESSMENT — PAIN SCALES - GENERAL
PAINLEVEL_OUTOF10: 0 - NO PAIN

## 2023-12-18 ASSESSMENT — PAIN - FUNCTIONAL ASSESSMENT
PAIN_FUNCTIONAL_ASSESSMENT: 0-10

## 2023-12-18 NOTE — ANESTHESIA PREPROCEDURE EVALUATION
Patient: Autumn Hyde    Procedure Information       Date/Time: 12/18/23 1430    Procedure: Suspension Urethra with Retropubic Sling    Location: [facility] / [facility]    Surgeons: Harpreet Kaur MD            Relevant Problems   Anesthesia (within normal limits)   No history of complications History of anesthesia complications      Endocrine   (+) Hypothyroidism      GI   (+) GERD (gastroesophageal reflux disease)      Neuro/Psych   (+) Carpal tunnel syndrome on left   (+) Depression      Musculoskeletal   (+) Carpal tunnel syndrome on left      Infectious Disease   (+) Recurrent genital herpes       Clinical information reviewed:   Tobacco  Allergies  Meds   Med Hx  Surg Hx            NPO Detail:  NPO/Void Status  Date of Last Liquid: 12/17/23  Time of Last Liquid: 2200  Date of Last Solid: 12/17/23  Time of Last Solid: 2200         Physical Exam    Airway  Mallampati: I  TM distance: >3 FB  Neck ROM: full     Cardiovascular - normal exam     Dental    Pulmonary - normal exam     Abdominal - normal exam             Anesthesia Plan    ASA 1     general     The patient is not a current smoker.    intravenous induction   Anesthetic plan and risks discussed with patient.  Use of blood products discussed with patient who.    Plan discussed with CRNA.

## 2023-12-18 NOTE — OP NOTE
Suspension Urethra with Retropubic Sling Operative Note     Date: 2023  OR Location: [facility]    Name: Autumn Hyde, : 1980, Age: 43 y.o., MRN: [id number], Sex: female    Diagnosis  Pre-op Diagnosis     * Stress incontinence (female) (male) [N39.3] Post-op Diagnosis     * Stress incontinence (female) (male) [N39.3]     Procedures  Suspension Urethra with Retropubic Sling  02759 - AR SLING OPERATION STRESS INCONTINENCE      Surgeons      * Harpreet Kaur - Primary    Resident/Fellow/Other Assistant:  Surgeon(s) and Role:     * Anthony Tobias MD - Resident - Assisting    Procedure Summary  Anesthesia: Consult  ASA: I  Anesthesia Staff: CRNA: DANNY Parikh-CRNA  Estimated Blood Loss: 10 mL  Intra-op Medications: * No intraprocedure medications in log *           Anesthesia Record               Intraprocedure I/O Totals          Intake    lactated Ringer's infusion 500.00 mL    Total Intake 500 mL       Output    Est. Blood Loss 10 mL    Total Output 10 mL       Net    Net Volume 490 mL          Specimen: No specimens collected     Staff:   Circulator: Giselle Castellanos RN  Relief Scrub: Linh Russell RN  Scrub Person: Nataly Velez         Drains and/or Catheters:   Urethral Catheter 16 Fr. (Active)   Output (mL) 600 mL 23 1617       Tourniquet Times:         Implants:     Findings: normal bladder    Indications: Autumn Hyde is an 43 y.o. female who is having surgery for Stress incontinence (female) (male) [N39.3].     The patient was seen in the preoperative area. The risks, benefits, complications, treatment options, non-operative alternatives, expected recovery and outcomes were discussed with the patient. The possibilities of reaction to medication, pulmonary aspiration, injury to surrounding structures, bleeding, recurrent infection, the need for additional procedures, failure to diagnose a condition, and creating a complication requiring transfusion or  operation were discussed with the patient. The patient concurred with the proposed plan, giving informed consent.  The site of surgery was properly noted/marked if necessary per policy. The patient has been actively warmed in preoperative area. Preoperative antibiotics have been ordered and given within 1 hours of incision. Venous thrombosis prophylaxis have been ordered including bilateral sequential compression devices    Procedure Details: Two sites were identified with each site 2.5 cm. from the midline at the level of the symphysis pubis. 1% lidocaine with epinephrine was injected vaginally under the urethra in the vaginal epithelium and bilaterally in the direction of two periurethral tunnels that were about to be created for the sling.  At this point, a 2 cm. sagittal excision was performed vaginally, beginning 1 cm. beneath the urethral meatus. Sharp dissection was carried out bilaterally using Metzenbaum scissors and periurethral tunnels were created bilaterally.  A Hitchcock catheter with a catheter guide was inserted into the bladder.   The TVT trocars were passed vaginally and retropubically with bladder deviation to the opposite side until it appeared suprapubically through a stab incision. The catheter was removed.  A cystoscopy was performed, which was entirely normal.  The bladder was drained.   The trocars were brought up entirely suprapubically and excised.  The end of the mesh was held in place with a Nina clamp.   The end of the mesh was held in place with a Nina clamp. Tension was adjusted on the mesh by drawing up on the suprapubic ends with a #8 Hegar dilator maintained between the tape and the urethra. The plastic sheaths were removed bilaterally.  The excess mesh was excised suprapubically.  The suprapubic sites were closed with interrupted stitches of 4-0 Vicryl suture.  The vaginal incision was closed with a running stitch of 0 Vicryl suture.       Complications:  None; patient tolerated the  procedure well.    Disposition: PACU - hemodynamically stable.  Condition: stable         Additional Details:     Attending Attestation: I was present and scrubbed for the entire procedure.    Harpreet Kaur  Phone Number: [phone]

## 2023-12-18 NOTE — ANESTHESIA PROCEDURE NOTES
Airway  Date/Time: 12/18/2023 3:50 PM  Urgency: elective    Airway not difficult    Staffing  Performed: CRNA   Authorized by: DANNY Parikh-ELMO    Performed by: DANNY Parikh-ELMO  Patient location during procedure: OR    Indications and Patient Condition  Indications for airway management: anesthesia  Spontaneous Ventilation: absent  Sedation level: deep  Preoxygenated: yes  Mask difficulty assessment: 0 - not attempted    Final Airway Details  Final airway type: supraglottic airway      Successful airway: Supraglottic airway: iGel.  Size 4     Number of attempts at approach: 1

## 2023-12-18 NOTE — H&P
History Of Present Illness  Autumn Hyde is a 43 y.o. female presenting with stress urinary incontinence.     Past Medical History  Past Medical History:   Diagnosis Date    Acute depression     Acute sinusitis 05/10/2023    Acute upper respiratory infection, unspecified 11/17/2015    Viral URI    Chlamydial vulvovaginitis 07/01/2020    Chlamydia vaginitis/cervicitis    Chronic low back pain 05/10/2023    CTS (carpal tunnel syndrome)     Diarrhea 05/10/2023    GERD (gastroesophageal reflux disease)     Gonococcal infection, unspecified 07/02/2020    Gonorrhea in female    Obesity, unspecified 04/15/2019    Obesity, Class II, BMI 35-39.9    Pain in both feet 05/10/2023    Pelvic and perineal pain     Vaginal pain    Personal history of other infectious and parasitic diseases 07/01/2020    History of gonorrhea    Personal history of other specified conditions 12/17/2020    History of abdominal pain    Personal history of other specified conditions 05/04/2020    History of wheezing    Personal history of other specified conditions 02/23/2017    History of fatigue    Sprain of ankle, right 05/10/2023    Stress incontinence     Ulceration of vulva 06/29/2020    Vulvar ulcer    Umbilical hernia 05/10/2023    Urinary frequency 05/10/2023    Urinary tract infection, site not specified 06/29/2020    Acute urinary tract infection       Surgical History  Past Surgical History:   Procedure Laterality Date    OTHER SURGICAL HISTORY  11/18/2016    Abd Aorta Aneurysm Repair 2 Dock Limbs W/ Visc Extens Prosth    OTHER SURGICAL HISTORY  10/15/2020    Hernia repair    TUBAL LIGATION  11/18/2016    Tubal Ligation    WISDOM TOOTH EXTRACTION          Social History  She reports that she quit smoking about 10 years ago. Her smoking use included cigarettes. She has never used smokeless tobacco. She reports current alcohol use of about 2.0 standard drinks of alcohol per week. She reports that she does not use drugs.    Family  History  No family history on file.     Allergies  Patient has no known allergies.    Review of Systems   Genitourinary:         Stress incontinence, OAB   All other systems reviewed and are negative.       Physical Exam  Vitals reviewed.   Constitutional:       Appearance: Normal appearance.   HENT:      Head: Normocephalic and atraumatic.   Cardiovascular:      Rate and Rhythm: Normal rate.   Pulmonary:      Effort: Pulmonary effort is normal.   Abdominal:      General: Abdomen is flat.      Palpations: Abdomen is soft.   Skin:     General: Skin is warm and dry.   Neurological:      Mental Status: She is alert.          Last Recorded Vitals  There were no vitals taken for this visit.       Assessment/Plan   Active Problems:    S/P urethral surgery      Proceed with scheduled surgery, informed consent performed   2g Ancef ordered  Discussed post op medications and restrictions         Manjula Back MD

## 2023-12-20 ENCOUNTER — HOSPITAL ENCOUNTER (OUTPATIENT)
Facility: HOSPITAL | Age: 43
Setting detail: OUTPATIENT SURGERY
Discharge: HOME | End: 2023-12-20
Attending: ORTHOPAEDIC SURGERY | Admitting: ORTHOPAEDIC SURGERY
Payer: COMMERCIAL

## 2023-12-20 VITALS
BODY MASS INDEX: 33.42 KG/M2 | HEART RATE: 52 BPM | OXYGEN SATURATION: 100 % | WEIGHT: 195.77 LBS | RESPIRATION RATE: 16 BRPM | DIASTOLIC BLOOD PRESSURE: 78 MMHG | SYSTOLIC BLOOD PRESSURE: 146 MMHG | HEIGHT: 64 IN | TEMPERATURE: 97 F

## 2023-12-20 DIAGNOSIS — G56.02 CARPAL TUNNEL SYNDROME ON LEFT: Primary | ICD-10-CM

## 2023-12-20 PROCEDURE — 7100000010 HC PHASE TWO TIME - EACH INCREMENTAL 1 MINUTE: Performed by: ORTHOPAEDIC SURGERY

## 2023-12-20 PROCEDURE — 2500000005 HC RX 250 GENERAL PHARMACY W/O HCPCS: Performed by: ORTHOPAEDIC SURGERY

## 2023-12-20 PROCEDURE — 3600000003 HC OR TIME - INITIAL BASE CHARGE - PROCEDURE LEVEL THREE: Performed by: ORTHOPAEDIC SURGERY

## 2023-12-20 PROCEDURE — 2500000004 HC RX 250 GENERAL PHARMACY W/ HCPCS (ALT 636 FOR OP/ED): Performed by: ORTHOPAEDIC SURGERY

## 2023-12-20 PROCEDURE — A4217 STERILE WATER/SALINE, 500 ML: HCPCS | Performed by: ORTHOPAEDIC SURGERY

## 2023-12-20 PROCEDURE — 64721 CARPAL TUNNEL SURGERY: CPT | Performed by: ORTHOPAEDIC SURGERY

## 2023-12-20 PROCEDURE — 2500000001 HC RX 250 WO HCPCS SELF ADMINISTERED DRUGS (ALT 637 FOR MEDICARE OP): Performed by: ORTHOPAEDIC SURGERY

## 2023-12-20 PROCEDURE — 3600000008 HC OR TIME - EACH INCREMENTAL 1 MINUTE - PROCEDURE LEVEL THREE: Performed by: ORTHOPAEDIC SURGERY

## 2023-12-20 PROCEDURE — 7100000009 HC PHASE TWO TIME - INITIAL BASE CHARGE: Performed by: ORTHOPAEDIC SURGERY

## 2023-12-20 RX ORDER — BUPIVACAINE HCL/EPINEPHRINE 0.25-.0005
VIAL (ML) INJECTION AS NEEDED
Status: DISCONTINUED | OUTPATIENT
Start: 2023-12-20 | End: 2023-12-20 | Stop reason: HOSPADM

## 2023-12-20 RX ORDER — CHLORHEXIDINE GLUCONATE 40 MG/ML
SOLUTION TOPICAL AS NEEDED
Status: DISCONTINUED | OUTPATIENT
Start: 2023-12-20 | End: 2023-12-20 | Stop reason: HOSPADM

## 2023-12-20 RX ORDER — HYDROCODONE BITARTRATE AND ACETAMINOPHEN 5; 325 MG/1; MG/1
1 TABLET ORAL EVERY 6 HOURS PRN
Qty: 6 TABLET | Refills: 0 | Status: SHIPPED | OUTPATIENT
Start: 2023-12-20 | End: 2023-12-22

## 2023-12-20 RX ORDER — LIDOCAINE HYDROCHLORIDE AND EPINEPHRINE 10; 10 MG/ML; UG/ML
INJECTION, SOLUTION INFILTRATION; PERINEURAL AS NEEDED
Status: DISCONTINUED | OUTPATIENT
Start: 2023-12-20 | End: 2023-12-20 | Stop reason: HOSPADM

## 2023-12-20 RX ORDER — SODIUM CHLORIDE 0.9 G/100ML
IRRIGANT IRRIGATION AS NEEDED
Status: DISCONTINUED | OUTPATIENT
Start: 2023-12-20 | End: 2023-12-20 | Stop reason: HOSPADM

## 2023-12-20 ASSESSMENT — PAIN SCALES - GENERAL: PAINLEVEL_OUTOF10: 0 - NO PAIN

## 2023-12-20 NOTE — DISCHARGE INSTRUCTIONS
Dr. Spears Post-Operative Instructions  Hand/Wrist/Elbow    Activity:  Rest on the day of surgery.  Gradually resume your regular diet.    Anesthesia:  Numbing medication may last for 8-24 hours.    Post-Operative Medications:  You may resume your regular medications (including blood thinners).  You have been given a prescription for pain medication as needed.  You may also take over-the-counter anti-inflammatories and/or Tylenol for pain relief.  If you are taking the prescribed pain medication you must limit additional Tylenol (acetaminophen) intake to avoid overdose.    Dressings:  Keep your dressings clean and dry.  You may cover with a plastic bag or cast cover and seal bag to your skin above the bandage for showering.  If your dressing becomes wet or significantly bloodstained call the office at [phone].  Okay to remove outer dressings after 2-3 days and shower/wash hands.  Do not soak wound (I.e. no swimming pool, hot tub or dishes).    Post-Operative Care:  Keep the surgical site elevated above the level of your heart to limit swelling.  If your fingers are not included in the dressing you are encouraged to move your fingers regularly (full fist and full extension).  Regularly ice the surgical site.  You may also apply ice pack above the level of the dressing and this will cool the blood as it travels towards the surgical site.    Call Surgeon/Office at any time for:           Office Number: [phone]  Excessive bleeding  Loss of feeling (The local numbing medicine from surgery typically lasts 8-12 hours.  Nerve blocks can last 18-36 hours.)  Tight dressing: Make sure that you are elevating the operative site appropriately.  If no relief then call the office.                                                                                                        Circulation issues:  If fingers change to white or blue  Concerns/Problems with your surgery

## 2023-12-20 NOTE — OP NOTE
Pre-Operative Diagnosis: left carpal tunnel syndrome  Post-Operative Diagnosis: same  Procedure: left carpal tunnel release  Surgeon: Regan  Assistant: Rosalino  Anesthesia: Local   Complications: none  Estimated blood loss: minimal  Specimen: none  Findings: See below  Disposition: Good/PACU      Indications: Patient has failed conservative treatment for left carpal tunnel syndrome. After reviewing risks and benefits of continued nonoperative versus operative treatment they have decided to proceed with open carpal tunnel release. Patient understands that primary goal of surgery is to prevent further worsening of symptoms, but that there is no guarantee. Expected operative and postoperative courses reviewed and all questions addressed.    Operative course: Patient was greeted in the preoperative holding area and the operative site was marked with indelible marker.  After confirming patient identifiers and surgical site a mixture of lidocaine and Marcaine with epinephrine was infiltrated about the planned incision site using sterile technique.  Patient was brought back to the operating room suite where upper extremity was prepped and draped in standard sterile fashion and timeout procedure was performed as per standard protocol.  Longitudinal incision made overlying level of transverse carpal ligament in line with the radial border of the ring finger. Gentle spreading was carried down through the palmar fascia and transverse carpal ligament was identified and sharply released in a distal to proximal direction under direct visualization. Complete release proximally was confirmed visually as well as by palpation. Gentle spreading distally also confirmed complete release.  Median nerve was inspected and confirmed to be fully intact. Wound was then irrigated and reapproximated with 4-0 Monocryl suture in a buried interrupted fashion followed by Exofin on the skin.  Dry sterile dressings were applied and patient was taken  to the recovery room for further care.    Patient will follow-up in 2 weeks for clinical check.

## 2023-12-20 NOTE — H&P
History of Present Illness:  Patient has failed conservative treatment for left carpal tunnel syndrome    Past Medical History:   Diagnosis Date    Acute depression     Acute sinusitis 05/10/2023    Acute upper respiratory infection, unspecified 11/17/2015    Viral URI    Chlamydial vulvovaginitis 07/01/2020    Chlamydia vaginitis/cervicitis    Chronic low back pain 05/10/2023    CTS (carpal tunnel syndrome)     Diarrhea 05/10/2023    GERD (gastroesophageal reflux disease)     Gonococcal infection, unspecified 07/02/2020    Gonorrhea in female    Obesity, unspecified 04/15/2019    Obesity, Class II, BMI 35-39.9    Pain in both feet 05/10/2023    Pelvic and perineal pain     Vaginal pain    Personal history of other infectious and parasitic diseases 07/01/2020    History of gonorrhea    Personal history of other specified conditions 12/17/2020    History of abdominal pain    Personal history of other specified conditions 05/04/2020    History of wheezing    Personal history of other specified conditions 02/23/2017    History of fatigue    Sprain of ankle, right 05/10/2023    Stress incontinence     Ulceration of vulva 06/29/2020    Vulvar ulcer    Umbilical hernia 05/10/2023    Urinary frequency 05/10/2023    Urinary tract infection, site not specified 06/29/2020    Acute urinary tract infection       Medication Documentation Review Audit       Reviewed by Abril Bridges RN (Registered Nurse) on 12/18/23 at 1332      Medication Order Taking? Sig Documenting Provider Last Dose Status   albuterol 90 mcg/actuation inhaler [id number] Yes Inhale 2 puffs every 6 hours if needed for shortness of breath or wheezing. Historical Provider, MD Past Month Active   amoxicillin-pot clavulanate (Augmentin ES-600) 600-42.9 mg/5 mL suspension [id number] No Take 875-125 mg q12h for 7 days   Patient not taking: Reported on 12/18/2023    Harpreet Kaur MD Not Taking Active   citalopram (CeleXA) 10 mg tablet [id number] Yes TAKE ONE TABLET BY  MOUTH ONCE A DAY Annemarie Willoughby, DO 12/17/2023 Active   fluticasone (Flonase) 50 mcg/actuation nasal spray [id number] No Administer into affected nostril(s) twice a day. Historical Provider, MD More than a month Active   levothyroxine (Synthroid, Levoxyl) 50 mcg tablet [id number] Yes Take 1 tablet (50 mcg) by mouth once daily. Historical Provider, MD 12/17/2023 Active   oxybutynin XL (Ditropan-XL) 10 mg 24 hr tablet [id number] Yes Take 1 tablet (10 mg) by mouth once daily. Harpreet Kaur MD 12/17/2023 Active   pantoprazole (ProtoNix) 20 mg EC tablet [id number] Yes Take 1 tablet (20 mg) by mouth once daily. Diamante Georges, APRN-CNP 12/17/2023 Active   vibegron (Gemtesa) 75 mg tablet [id number] Yes Take 1 tablet (75 mg) by mouth once daily. Historical Provider, MD 12/17/2023 Active                    No Known Allergies    Social History     Socioeconomic History    Marital status:      Spouse name: Not on file    Number of children: Not on file    Years of education: Not on file    Highest education level: Not on file   Occupational History    Not on file   Tobacco Use    Smoking status: Former     Types: Cigarettes     Quit date: 2013     Years since quitting: 10.9    Smokeless tobacco: Never   Vaping Use    Vaping Use: Never used   Substance and Sexual Activity    Alcohol use: Yes     Alcohol/week: 2.0 standard drinks of alcohol     Types: 2 Glasses of wine per week     Comment: social    Drug use: Never    Sexual activity: Not on file   Other Topics Concern    Not on file   Social History Narrative    Not on file     Social Determinants of Health     Financial Resource Strain: Not on file   Food Insecurity: Not on file   Transportation Needs: Not on file   Physical Activity: Not on file   Stress: Not on file   Social Connections: Not on file   Intimate Partner Violence: Not on file   Housing Stability: Not on file       Past Surgical History:   Procedure Laterality Date    OTHER SURGICAL HISTORY  11/18/2016    Abd  Aorta Aneurysm Repair 2 Dock Limbs W/ Visc Extens Prosth    OTHER SURGICAL HISTORY  10/15/2020    Hernia repair    TUBAL LIGATION  11/18/2016    Tubal Ligation    WISDOM TOOTH EXTRACTION          Review of Systems   GENERAL: Negative for malaise, significant weight loss, fever  MUSCULOSKELETAL: see HPI  NEURO: See HPI     Physical Examination  Constitutional: Appears well-developed and well-nourished.  Head: Normocephalic and atraumatic.  Eyes: EOMI grossly  Cardiovascular: Intact distal pulses.   Respiratory: Effort normal. No respiratory distress.  Neurologic: Alert and oriented to person, place, and time.  Skin: Skin is warm and dry.  Hematologic / Lymphatic: No lymphedema, lymphangitis.  Psychiatric: normal mood and affect. Behavior is normal.   Musculoskeletal:  Left hand: Positive Pola's       Assessment:  Patient with left carpal tunnel syndrome     Plan:  Nature of the diagnosis was discussed with the patient.  Risks and benefits of continued nonoperative versus operative treatment options were reviewed.  The surgical benefits and the potential complications were reviewed with the patient today, as well as the day surgical setting and the likely postoperative course.  Patient understands that the goal of surgery is prevention of worsening symptoms and potential relief of some symptoms.  Risks discussed included, but were not limited to, residual/recurrent/worsening symptoms, pain, infection, bleeding, stiffness, injury to nerve/blood vessels/tendons, wound healing issues and possible need for reoperation.  I answered the patient's questions and surgical consent reviewed and obtained.  The patient has elected to proceed with surgery and we will schedule it at the patient's convenience.    The patient will followup with us in the operating room and then postoperatively.

## 2023-12-21 ASSESSMENT — PAIN SCALES - GENERAL: PAIN_LEVEL: 2

## 2023-12-21 NOTE — TELEPHONE ENCOUNTER
- resolved    Attempted to call patient- no answer & left message to return call to pick date for surgery

## 2023-12-22 NOTE — ANESTHESIA POSTPROCEDURE EVALUATION
Patient: Autumn Hyde    Procedure Summary       Date: 12/18/23 Room / Location: [facility] 07 / [facility]    Anesthesia Start: 1538 Anesthesia Stop: 1633    Procedure: Suspension Urethra with Retropubic Sling (Vagina ) Diagnosis:       Stress incontinence (female) (male)      (Stress incontinence (female) (male) [N39.3])    Surgeons: Harpreet Kaur MD Responsible Provider: MONSE Parikh    Anesthesia Type: general ASA Status: 1            Anesthesia Type: general    Vitals Value Taken Time   BP 96/48 12/18/23 1645   Temp 36 °C (96.8 °F) 12/18/23 1628   Pulse 60 12/18/23 1645   Resp 10 12/18/23 1645   SpO2 99 % 12/18/23 1645       Anesthesia Post Evaluation    Patient location during evaluation: PACU  Patient participation: complete - patient participated  Level of consciousness: awake  Pain score: 2  Pain management: adequate  Multimodal analgesia pain management approach  Airway patency: patent  Two or more strategies used to mitigate risk of obstructive sleep apnea  Cardiovascular status: acceptable  Respiratory status: acceptable  Hydration status: acceptable  Postoperative Nausea and Vomiting: none    No notable events documented.

## 2024-01-03 ENCOUNTER — OFFICE VISIT (OUTPATIENT)
Dept: ORTHOPEDIC SURGERY | Facility: CLINIC | Age: 44
End: 2024-01-03
Payer: COMMERCIAL

## 2024-01-03 DIAGNOSIS — Z98.890 S/P CARPAL TUNNEL RELEASE: ICD-10-CM

## 2024-01-03 DIAGNOSIS — G56.02 CARPAL TUNNEL SYNDROME ON LEFT: Primary | ICD-10-CM

## 2024-01-03 PROCEDURE — 99024 POSTOP FOLLOW-UP VISIT: CPT

## 2024-01-03 PROCEDURE — 1036F TOBACCO NON-USER: CPT

## 2024-01-03 NOTE — PROGRESS NOTES
History of Present Illness  Chief Complaint   Patient presents with    Left Wrist - Post-op     CTR patient states its still a bit numb but doing good     S/p side: left carpal tunnel release on 12/20/23  Patient returns today denying any significant pain.  Endorses good relief of pre-op symptoms.  States the thumb just started to feel less numb over the last few days.  Denies any new neuro deficits. No fever or drainage.     Review of Systems   GENERAL: Negative for malaise, significant weight loss, fever  MUSCULOSKELETAL: see HPI  NEURO:  Negative     Examination  side: left wrist  Healthy incision without erythema, warmth, or drainage   Sensation intact median, ulnar and radial nerve distribution   + Opposition of thumb  Good cap refill     Assessment:  Patient status post side: left carpal tunnel release     Plan  Patient to continue to use left hand to tolerance.  Weight bearing as tolerated.  Wound is healing nicely. Discussed incision care and scar massage.  Discussed wrist splint as needed.  Encouraged ROM of digits, formal OT if any difficulties.  Follow-up: marlena Finn PA-C  01/03/24

## 2024-01-17 DIAGNOSIS — F32.89 OTHER DEPRESSION: ICD-10-CM

## 2024-01-17 RX ORDER — CITALOPRAM 10 MG/1
10 TABLET ORAL DAILY
Qty: 30 TABLET | Refills: 0 | OUTPATIENT
Start: 2024-01-17

## 2024-03-22 DIAGNOSIS — N32.81 OAB (OVERACTIVE BLADDER): Primary | ICD-10-CM

## 2024-04-02 RX ORDER — OXYBUTYNIN CHLORIDE 10 MG/1
10 TABLET, EXTENDED RELEASE ORAL DAILY
Qty: 90 TABLET | Refills: 0 | Status: SHIPPED | OUTPATIENT
Start: 2024-04-02 | End: 2024-04-29 | Stop reason: SDUPTHER

## 2024-04-29 DIAGNOSIS — N32.81 OAB (OVERACTIVE BLADDER): ICD-10-CM

## 2024-04-29 PROCEDURE — RXMED WILLOW AMBULATORY MEDICATION CHARGE

## 2024-04-29 RX ORDER — OXYBUTYNIN CHLORIDE 10 MG/1
10 TABLET, EXTENDED RELEASE ORAL DAILY
Qty: 90 TABLET | Refills: 0 | Status: SHIPPED | OUTPATIENT
Start: 2024-04-29

## 2024-05-02 ENCOUNTER — PHARMACY VISIT (OUTPATIENT)
Dept: PHARMACY | Facility: CLINIC | Age: 44
End: 2024-05-02
Payer: COMMERCIAL

## 2024-06-26 DIAGNOSIS — R30.0 DYSURIA: Primary | ICD-10-CM

## 2024-06-26 DIAGNOSIS — N93.9 ABNORMAL UTERINE BLEEDING (AUB): ICD-10-CM

## 2024-06-26 DIAGNOSIS — M62.89 PELVIC FLOOR DYSFUNCTION: ICD-10-CM

## 2024-06-27 ENCOUNTER — LAB (OUTPATIENT)
Dept: LAB | Facility: LAB | Age: 44
End: 2024-06-27
Payer: COMMERCIAL

## 2024-06-27 DIAGNOSIS — R30.0 DYSURIA: ICD-10-CM

## 2024-06-27 PROCEDURE — 87086 URINE CULTURE/COLONY COUNT: CPT

## 2024-06-28 ENCOUNTER — HOSPITAL ENCOUNTER (OUTPATIENT)
Dept: RADIOLOGY | Facility: HOSPITAL | Age: 44
Discharge: HOME | End: 2024-06-28
Payer: COMMERCIAL

## 2024-06-28 DIAGNOSIS — R30.0 DYSURIA: ICD-10-CM

## 2024-06-28 DIAGNOSIS — M62.89 PELVIC FLOOR DYSFUNCTION: ICD-10-CM

## 2024-06-28 LAB — BACTERIA UR CULT: NORMAL

## 2024-06-28 PROCEDURE — 76856 US EXAM PELVIC COMPLETE: CPT

## 2024-07-03 PROBLEM — M79.642 PAIN IN BOTH HANDS: Status: ACTIVE | Noted: 2024-07-03

## 2024-07-03 PROBLEM — R13.10 DYSPHAGIA: Status: ACTIVE | Noted: 2024-07-03

## 2024-07-03 PROBLEM — M79.641 PAIN IN BOTH HANDS: Status: ACTIVE | Noted: 2024-07-03

## 2024-07-03 PROBLEM — M79.673 PAIN OF FOOT: Status: ACTIVE | Noted: 2024-07-03

## 2024-07-03 PROBLEM — R06.2 WHEEZING: Status: ACTIVE | Noted: 2024-07-03

## 2024-07-03 PROBLEM — R53.83 FATIGUE: Status: ACTIVE | Noted: 2024-07-03

## 2024-07-30 ENCOUNTER — APPOINTMENT (OUTPATIENT)
Dept: PRIMARY CARE | Facility: CLINIC | Age: 44
End: 2024-07-30
Payer: COMMERCIAL

## 2024-07-30 VITALS
OXYGEN SATURATION: 98 % | DIASTOLIC BLOOD PRESSURE: 76 MMHG | TEMPERATURE: 98.1 F | BODY MASS INDEX: 35.12 KG/M2 | WEIGHT: 198.2 LBS | HEART RATE: 68 BPM | HEIGHT: 63 IN | SYSTOLIC BLOOD PRESSURE: 126 MMHG

## 2024-07-30 DIAGNOSIS — Z12.31 ENCOUNTER FOR SCREENING MAMMOGRAM FOR MALIGNANT NEOPLASM OF BREAST: ICD-10-CM

## 2024-07-30 DIAGNOSIS — E03.9 ACQUIRED HYPOTHYROIDISM: Primary | ICD-10-CM

## 2024-07-30 DIAGNOSIS — E78.00 HYPERCHOLESTEREMIA: ICD-10-CM

## 2024-07-30 DIAGNOSIS — F32.89 OTHER DEPRESSION: ICD-10-CM

## 2024-07-30 DIAGNOSIS — H91.93 BILATERAL HEARING LOSS, UNSPECIFIED HEARING LOSS TYPE: ICD-10-CM

## 2024-07-30 DIAGNOSIS — E55.9 VITAMIN D DEFICIENCY: ICD-10-CM

## 2024-07-30 DIAGNOSIS — K21.9 GASTROESOPHAGEAL REFLUX DISEASE, UNSPECIFIED WHETHER ESOPHAGITIS PRESENT: ICD-10-CM

## 2024-07-30 DIAGNOSIS — N32.81 OAB (OVERACTIVE BLADDER): ICD-10-CM

## 2024-07-30 PROBLEM — M79.641 PAIN IN BOTH HANDS: Status: RESOLVED | Noted: 2024-07-03 | Resolved: 2024-07-30

## 2024-07-30 PROBLEM — M79.673 PAIN OF FOOT: Status: RESOLVED | Noted: 2024-07-03 | Resolved: 2024-07-30

## 2024-07-30 PROBLEM — M62.08 RECTUS DIASTASIS: Status: RESOLVED | Noted: 2023-05-10 | Resolved: 2024-07-30

## 2024-07-30 PROBLEM — M79.642 PAIN IN BOTH HANDS: Status: RESOLVED | Noted: 2024-07-03 | Resolved: 2024-07-30

## 2024-07-30 LAB
ALBUMIN SERPL BCP-MCNC: 4.3 G/DL (ref 3.4–5)
ALP SERPL-CCNC: 86 U/L (ref 33–110)
ALT SERPL W P-5'-P-CCNC: 35 U/L (ref 7–45)
ANION GAP SERPL CALC-SCNC: 11 MMOL/L (ref 10–20)
AST SERPL W P-5'-P-CCNC: 24 U/L (ref 9–39)
BASOPHILS # BLD AUTO: 0.02 X10*3/UL (ref 0–0.1)
BASOPHILS NFR BLD AUTO: 0.2 %
BILIRUB SERPL-MCNC: 0.3 MG/DL (ref 0–1.2)
BUN SERPL-MCNC: 12 MG/DL (ref 6–23)
CALCIUM SERPL-MCNC: 9.4 MG/DL (ref 8.6–10.3)
CHLORIDE SERPL-SCNC: 101 MMOL/L (ref 98–107)
CHOLEST SERPL-MCNC: 199 MG/DL (ref 0–199)
CHOLESTEROL/HDL RATIO: 4
CO2 SERPL-SCNC: 30 MMOL/L (ref 21–32)
CREAT SERPL-MCNC: 0.97 MG/DL (ref 0.5–1.05)
EGFRCR SERPLBLD CKD-EPI 2021: 75 ML/MIN/1.73M*2
EOSINOPHIL # BLD AUTO: 0.06 X10*3/UL (ref 0–0.7)
EOSINOPHIL NFR BLD AUTO: 0.7 %
ERYTHROCYTE [DISTWIDTH] IN BLOOD BY AUTOMATED COUNT: 12.9 % (ref 11.5–14.5)
GLUCOSE SERPL-MCNC: 77 MG/DL (ref 74–99)
HCT VFR BLD AUTO: 43.1 % (ref 36–46)
HDLC SERPL-MCNC: 50.2 MG/DL
HGB BLD-MCNC: 14.4 G/DL (ref 12–16)
IMM GRANULOCYTES # BLD AUTO: 0.03 X10*3/UL (ref 0–0.7)
IMM GRANULOCYTES NFR BLD AUTO: 0.3 % (ref 0–0.9)
LDLC SERPL CALC-MCNC: 98 MG/DL
LYMPHOCYTES # BLD AUTO: 3.11 X10*3/UL (ref 1.2–4.8)
LYMPHOCYTES NFR BLD AUTO: 35.1 %
MCH RBC QN AUTO: 27.7 PG (ref 26–34)
MCHC RBC AUTO-ENTMCNC: 33.4 G/DL (ref 32–36)
MCV RBC AUTO: 83 FL (ref 80–100)
MONOCYTES # BLD AUTO: 0.31 X10*3/UL (ref 0.1–1)
MONOCYTES NFR BLD AUTO: 3.5 %
NEUTROPHILS # BLD AUTO: 5.32 X10*3/UL (ref 1.2–7.7)
NEUTROPHILS NFR BLD AUTO: 60.2 %
NON HDL CHOLESTEROL: 149 MG/DL (ref 0–149)
NRBC BLD-RTO: 0 /100 WBCS (ref 0–0)
PLATELET # BLD AUTO: 246 X10*3/UL (ref 150–450)
POTASSIUM SERPL-SCNC: 4.3 MMOL/L (ref 3.5–5.3)
PROT SERPL-MCNC: 6.8 G/DL (ref 6.4–8.2)
RBC # BLD AUTO: 5.2 X10*6/UL (ref 4–5.2)
SODIUM SERPL-SCNC: 138 MMOL/L (ref 136–145)
TRIGL SERPL-MCNC: 254 MG/DL (ref 0–149)
TSH SERPL-ACNC: 1.68 MIU/L (ref 0.44–3.98)
VLDL: 51 MG/DL (ref 0–40)
WBC # BLD AUTO: 8.9 X10*3/UL (ref 4.4–11.3)

## 2024-07-30 PROCEDURE — 99214 OFFICE O/P EST MOD 30 MIN: CPT | Performed by: NURSE PRACTITIONER

## 2024-07-30 PROCEDURE — 84443 ASSAY THYROID STIM HORMONE: CPT

## 2024-07-30 PROCEDURE — 36415 COLL VENOUS BLD VENIPUNCTURE: CPT

## 2024-07-30 PROCEDURE — 1036F TOBACCO NON-USER: CPT | Performed by: NURSE PRACTITIONER

## 2024-07-30 PROCEDURE — 80061 LIPID PANEL: CPT

## 2024-07-30 PROCEDURE — 3008F BODY MASS INDEX DOCD: CPT | Performed by: NURSE PRACTITIONER

## 2024-07-30 PROCEDURE — 85025 COMPLETE CBC W/AUTO DIFF WBC: CPT

## 2024-07-30 PROCEDURE — 82306 VITAMIN D 25 HYDROXY: CPT

## 2024-07-30 PROCEDURE — 80053 COMPREHEN METABOLIC PANEL: CPT

## 2024-07-30 RX ORDER — PANTOPRAZOLE SODIUM 40 MG/1
40 TABLET, DELAYED RELEASE ORAL DAILY
COMMUNITY
End: 2024-07-30 | Stop reason: WASHOUT

## 2024-07-30 RX ORDER — CITALOPRAM 10 MG/1
10 TABLET ORAL DAILY
Qty: 30 TABLET | Refills: 0 | Status: SHIPPED | OUTPATIENT
Start: 2024-07-30

## 2024-07-30 ASSESSMENT — ENCOUNTER SYMPTOMS
ADENOPATHY: 0
ACTIVITY CHANGE: 0
PHOTOPHOBIA: 0
SHORTNESS OF BREATH: 0
EYE DISCHARGE: 0
HEADACHES: 0
APPETITE CHANGE: 0
BACK PAIN: 0
DYSURIA: 0
BLOOD IN STOOL: 0
COUGH: 0
SORE THROAT: 0
OCCASIONAL FEELINGS OF UNSTEADINESS: 0
WEAKNESS: 0
JOINT SWELLING: 0
BRUISES/BLEEDS EASILY: 0
DEPRESSION: 0
NERVOUS/ANXIOUS: 0
CONSTIPATION: 0
LOSS OF SENSATION IN FEET: 0
SINUS PAIN: 0
AGITATION: 0
DIZZINESS: 0
ARTHRALGIAS: 0
ABDOMINAL PAIN: 0
WHEEZING: 0
TREMORS: 0
DIARRHEA: 0
PALPITATIONS: 0
HEMATURIA: 0

## 2024-07-30 ASSESSMENT — LIFESTYLE VARIABLES
HAVE YOU OR SOMEONE ELSE BEEN INJURED AS A RESULT OF YOUR DRINKING: NO
SKIP TO QUESTIONS 9-10: 1
HOW OFTEN DURING THE LAST YEAR HAVE YOU NEEDED AN ALCOHOLIC DRINK FIRST THING IN THE MORNING TO GET YOURSELF GOING AFTER A NIGHT OF HEAVY DRINKING: NEVER
HOW OFTEN DURING THE LAST YEAR HAVE YOU BEEN UNABLE TO REMEMBER WHAT HAPPENED THE NIGHT BEFORE BECAUSE YOU HAD BEEN DRINKING: NEVER
AUDIT-C TOTAL SCORE: 1
HOW OFTEN DO YOU HAVE SIX OR MORE DRINKS ON ONE OCCASION: NEVER
HOW OFTEN DURING THE LAST YEAR HAVE YOU FAILED TO DO WHAT WAS NORMALLY EXPECTED FROM YOU BECAUSE OF DRINKING: NEVER
HOW OFTEN DURING THE LAST YEAR HAVE YOU HAD A FEELING OF GUILT OR REMORSE AFTER DRINKING: NEVER
HAS A RELATIVE, FRIEND, DOCTOR, OR ANOTHER HEALTH PROFESSIONAL EXPRESSED CONCERN ABOUT YOUR DRINKING OR SUGGESTED YOU CUT DOWN: NO
AUDIT TOTAL SCORE: 1
HOW OFTEN DO YOU HAVE A DRINK CONTAINING ALCOHOL: MONTHLY OR LESS
HOW MANY STANDARD DRINKS CONTAINING ALCOHOL DO YOU HAVE ON A TYPICAL DAY: 1 OR 2
HOW OFTEN DURING THE LAST YEAR HAVE YOU FOUND THAT YOU WERE NOT ABLE TO STOP DRINKING ONCE YOU HAD STARTED: NEVER

## 2024-07-30 ASSESSMENT — PATIENT HEALTH QUESTIONNAIRE - PHQ9
10. IF YOU CHECKED OFF ANY PROBLEMS, HOW DIFFICULT HAVE THESE PROBLEMS MADE IT FOR YOU TO DO YOUR WORK, TAKE CARE OF THINGS AT HOME, OR GET ALONG WITH OTHER PEOPLE: NOT DIFFICULT AT ALL
SUM OF ALL RESPONSES TO PHQ9 QUESTIONS 1 AND 2: 1
1. LITTLE INTEREST OR PLEASURE IN DOING THINGS: NOT AT ALL
2. FEELING DOWN, DEPRESSED OR HOPELESS: SEVERAL DAYS

## 2024-07-30 ASSESSMENT — PAIN SCALES - GENERAL: PAINLEVEL: 0-NO PAIN

## 2024-07-30 NOTE — PROGRESS NOTES
Subjective   Patient ID: Autumn Hyde is a 44 y.o. female who presents for Establish Care (Discuss depression meds,thyroid, and numbness right leg above and below her knee.She states that it goes numb,loses feeling for a month.) and Hearing Problem.    Presents today to establish care.  Her previous provider was Diamante Matute CNP.  She reports this provider will be leaving the area.  She denies chest pain or shortness of breath.  She does admit to reflux symptoms.  She has been followed with Dr. Hewitt for dysphagia symptoms.  They were not able to determine a cause.  She reports she was told to discontinue her levothyroxine several years ago.  She is concerned that maybe she should be on it?  She has been out of her Celexa for a few months.  She would like to restart it.  She takes it for both anxiety and depression.  She denies feelings of self-harm or harm to others.  Only she notes those around her have noted that she is always speaking in a loud voice although she feels she is not speaking very loud she also has difficulty hearing on the telephone.    Hearing Problem  The current episode started more than 1 year ago. Pertinent negatives include no abdominal pain, arthralgias, chest pain, congestion, coughing, headaches, joint swelling, rash, sore throat or weakness. Nothing aggravates the symptoms. She has tried nothing for the symptoms.        Review of Systems   Constitutional:  Negative for activity change and appetite change.   HENT:  Negative for congestion, ear pain, hearing loss, sinus pain and sore throat.    Eyes:  Negative for photophobia, discharge and visual disturbance.   Respiratory:  Negative for cough, shortness of breath and wheezing.    Cardiovascular:  Negative for chest pain, palpitations and leg swelling.   Gastrointestinal:  Negative for abdominal pain, blood in stool, constipation and diarrhea.   Endocrine: Negative for cold intolerance, heat intolerance and polyuria.  "  Genitourinary:  Negative for dysuria and hematuria.   Musculoskeletal:  Negative for arthralgias, back pain and joint swelling.   Skin:  Negative for rash.   Allergic/Immunologic: Negative for environmental allergies and food allergies.   Neurological:  Negative for dizziness, tremors, syncope, weakness and headaches.   Hematological:  Negative for adenopathy. Does not bruise/bleed easily.   Psychiatric/Behavioral:  Negative for agitation and suicidal ideas. The patient is not nervous/anxious.        Objective   /76 (BP Location: Left arm, Patient Position: Sitting)   Pulse 68   Temp 36.7 °C (98.1 °F) (Temporal)   Ht 1.6 m (5' 3\")   Wt 89.9 kg (198 lb 3.2 oz)   SpO2 98%   BMI 35.11 kg/m²     Physical Exam  Vitals reviewed.   Constitutional:       General: She is not in acute distress.     Appearance: Normal appearance.   HENT:      Head: Normocephalic.      Right Ear: Tympanic membrane normal.      Left Ear: Tympanic membrane normal.      Nose: Nose normal.      Mouth/Throat:      Mouth: Mucous membranes are moist.   Eyes:      Conjunctiva/sclera: Conjunctivae normal.      Pupils: Pupils are equal, round, and reactive to light.   Cardiovascular:      Rate and Rhythm: Normal rate and regular rhythm.      Pulses: Normal pulses.      Heart sounds: Normal heart sounds.   Pulmonary:      Effort: Pulmonary effort is normal.      Breath sounds: Normal breath sounds.   Abdominal:      General: Bowel sounds are normal.      Palpations: Abdomen is soft.   Musculoskeletal:         General: Normal range of motion.   Skin:     General: Skin is warm and dry.      Capillary Refill: Capillary refill takes less than 2 seconds.   Neurological:      Mental Status: She is alert and oriented to person, place, and time.   Psychiatric:         Mood and Affect: Mood normal.         Speech: Speech normal.         Assessment/Plan   Problem List Items Addressed This Visit             ICD-10-CM    Depression F32.A    Relevant " Medications    citalopram (CeleXA) 10 mg tablet    Other Relevant Orders    Follow Up In Primary Care - Established    GERD (gastroesophageal reflux disease) K21.9    Relevant Orders    CBC and Auto Differential    Comprehensive Metabolic Panel    Follow Up In Primary Care - Established    Hypothyroidism - Primary E03.9    Relevant Orders    Comprehensive Metabolic Panel    TSH with reflex to Free T4 if abnormal    Follow Up In Primary Care - Established    OAB (overactive bladder) N32.81    Relevant Orders    Comprehensive Metabolic Panel     Other Visit Diagnoses         Codes    Encounter for screening mammogram for malignant neoplasm of breast     Z12.31    Relevant Orders    BI mammo bilateral screening tomosynthesis    Hypercholesteremia     E78.00    Relevant Orders    Lipid Panel    Vitamin D deficiency     E55.9    Relevant Orders    Vitamin D 25-Hydroxy,Total (for eval of Vitamin D levels)    Bilateral hearing loss, unspecified hearing loss type     H91.93    Relevant Orders    Referral to Audiology          Focus on healthy eating including lean proteins and vegetables.  Avoid high carbohydrate high sugar foods and drinks.  Try to increase physical activity as tolerated.  Goal is for 160 minutes/week of physical activity.  Check blood pressure 2-3 times a week.  Call for blood pressures greater than 140/90.  Bring list of blood pressures to next visit.  Labwork obtained today.  Will address results when available

## 2024-07-30 NOTE — PATIENT INSTRUCTIONS
Focus on healthy eating including lean proteins and vegetables.  Avoid high carbohydrate high sugar foods and drinks.  Try to increase physical activity as tolerated.  Goal is for 160 minutes/week of physical activity.  Check blood pressure 2-3 times a week.  Call for blood pressures greater than 140/90.  Bring list of blood pressures to next visit.  Labwork obtained today.  Will address results when available

## 2024-07-31 LAB — 25(OH)D3 SERPL-MCNC: 29 NG/ML (ref 30–100)

## 2024-08-30 ENCOUNTER — CLINICAL SUPPORT (OUTPATIENT)
Dept: BEHAVIORAL HEALTH | Facility: CLINIC | Age: 44
End: 2024-08-30
Payer: COMMERCIAL

## 2024-08-30 VITALS — SYSTOLIC BLOOD PRESSURE: 160 MMHG | DIASTOLIC BLOOD PRESSURE: 74 MMHG | HEART RATE: 69 BPM

## 2024-08-30 DIAGNOSIS — Z02.83 EMPLOYMENT-RELATED DRUG TESTING, ENCOUNTER FOR: Primary | ICD-10-CM

## 2024-08-30 LAB
AMPHETAMINES UR QL SCN: ABNORMAL
BARBITURATES UR QL SCN: ABNORMAL
BENZODIAZ UR QL SCN: ABNORMAL
BZE UR QL SCN: ABNORMAL
CANNABINOIDS UR QL SCN: ABNORMAL
FENTANYL+NORFENTANYL UR QL SCN: ABNORMAL
METHADONE UR QL SCN: ABNORMAL
OPIATES UR QL SCN: ABNORMAL
OXYCODONE+OXYMORPHONE UR QL SCN: ABNORMAL
PCP UR QL SCN: ABNORMAL

## 2024-08-30 PROCEDURE — 80365 DRUG SCREENING OXYCODONE: CPT | Performed by: NURSE PRACTITIONER

## 2024-08-30 PROCEDURE — 80307 DRUG TEST PRSMV CHEM ANLYZR: CPT | Performed by: NURSE PRACTITIONER

## 2024-08-30 PROCEDURE — 80346 BENZODIAZEPINES1-12: CPT | Performed by: NURSE PRACTITIONER

## 2024-08-30 PROCEDURE — 80349 CANNABINOIDS NATURAL: CPT | Performed by: NURSE PRACTITIONER

## 2024-08-30 ASSESSMENT — PAIN SCALES - GENERAL: PAINLEVEL: 0-NO PAIN
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
## 2024-09-02 LAB — ETHYL GLUCURONIDE UR QL SCN: NEGATIVE NG/ML

## 2024-09-04 ENCOUNTER — MULTIDISCIPLINARY VISIT (OUTPATIENT)
Dept: BEHAVIORAL HEALTH | Facility: CLINIC | Age: 44
End: 2024-09-04
Payer: COMMERCIAL

## 2024-09-04 DIAGNOSIS — F12.20 CANNABIS USE DISORDER, MODERATE (MULTI): Primary | ICD-10-CM

## 2024-09-04 DIAGNOSIS — N32.81 OAB (OVERACTIVE BLADDER): Primary | ICD-10-CM

## 2024-09-04 PROCEDURE — 90853 GROUP PSYCHOTHERAPY: CPT

## 2024-09-04 RX ORDER — OXYBUTYNIN CHLORIDE 10 MG/1
10 TABLET, EXTENDED RELEASE ORAL DAILY
Qty: 90 TABLET | Refills: 3 | Status: SHIPPED | OUTPATIENT
Start: 2024-09-04

## 2024-09-04 RX ORDER — OXYBUTYNIN CHLORIDE 10 MG/1
10 TABLET, EXTENDED RELEASE ORAL DAILY
Qty: 90 TABLET | Refills: 0 | Status: SHIPPED | OUTPATIENT
Start: 2024-09-04 | End: 2024-09-04 | Stop reason: SDUPTHER

## 2024-09-04 NOTE — GROUP NOTE
Group Topic: Chemical Dependency - Primary Disease   Group Date: 9/4/2024-[facility]  Start Time: 10:00 AM  End Time: 11:00 AM  Facilitators: CORNELL Gandhi   [facility]    Number of Participants: 6   Treatment Modality: Psychoeducation  Interventions utilized were patient education  Purpose: insight or knowledge  Comment: Family Disease    Family Addiction   Goal of this session was to increase awareness of the parallel symptoms between both family members and the person with the addiction- defining what constitutes 'family disease/recovery'.  Briefly reviewed recommended actions for families that will support their loved one during treatment. Today we reviewed signs/symptoms of the progression of addiction and recovery, for both family members and person with addiction. Discussion followed and members related.   Good eye contact. First day of treatment. First treatment experience.   Name: Autumn Hyde YOB: 1980   MR: [id number]      Level of Participation:  listening.   Quality of Participation: attentive and quiet  Mood/Affect: appropriate  Progress: Gaining insight or knowledge  Plan: continue with services

## 2024-09-04 NOTE — GROUP NOTE
Group Topic: Dialectical Behavioral Therapy - Mindfulness   Group Date: 9/4/2024  Start Time:  9:00 AM  End Time: 10:00 AM  Facilitators: DENISE Arana   Department: [facility]    Number of Participants: 6   Treatment Modality: Dialectical Behavioral Therapy  Interventions utilized were exploration, group exercise, and support  Purpose: feelings, communication skills, insight or knowledge, and trigger / craving management  Comment: Patient participated in a mindfulness meditation exercise and a group check-in.  The purpose and benefits of mindfulness meditation in strengthening relapse prevention skills was explained.  A mindfulness meditation exercise was conducted, and patient provided feedback in the form of personal observations from the exercise.    Name: Autumn Hyde YOB: 1980   MR: [id number]      Level of Participation: when cued  Quality of Participation: appropriate/pleasant, attentive, cooperative, and engaged  Mood/Affect: appropriate  Progress: Gaining insight or knowledge  Plan: continue with services

## 2024-09-04 NOTE — GROUP NOTE
Group Topic: Problem Solving   Group Date: 9/4/2024-[facility]  Start Time: 11:00 AM  End Time: 12:00 PM  Facilitators: CORNELL Gandhi; DENISE Arana   [facility]    Number of Participants: 6   Treatment Modality: Patient-Centered Therapy and Psychoeducation  Interventions utilized were exploration, problem solving, and support  Purpose: coping skills, maladaptive thinking, and insight or knowledge  Comment: Group Counseling    Today was a family focused treatment day and one support person was present. Group began with a meditative reading about patience and change, and pts. were encouraged to reflect and respond. Members were encouraged to provide the group with an update regarding their recovery progress, as well as discuss shared experiences and observations.    Observant and when prompted shared that her  is supportive of treatment.   Name: Autumn Hyde YOB: 1980   MR: [id number]      Level of Participation: when cued  Quality of Participation: attentive  Mood/Affect: appropriate  Progress: Gaining insight or knowledge  Plan: continue with services

## 2024-09-05 ENCOUNTER — TELEPHONE (OUTPATIENT)
Dept: PRIMARY CARE | Facility: CLINIC | Age: 44
End: 2024-09-05

## 2024-09-05 ENCOUNTER — MULTIDISCIPLINARY VISIT (OUTPATIENT)
Dept: BEHAVIORAL HEALTH | Facility: CLINIC | Age: 44
End: 2024-09-05
Payer: COMMERCIAL

## 2024-09-05 DIAGNOSIS — F32.89 OTHER DEPRESSION: ICD-10-CM

## 2024-09-05 DIAGNOSIS — F12.20 CANNABIS USE DISORDER, MODERATE (MULTI): ICD-10-CM

## 2024-09-05 PROCEDURE — 80307 DRUG TEST PRSMV CHEM ANLYZR: CPT

## 2024-09-05 PROCEDURE — 80349 CANNABINOIDS NATURAL: CPT

## 2024-09-05 PROCEDURE — 90853 GROUP PSYCHOTHERAPY: CPT

## 2024-09-05 PROCEDURE — RXMED WILLOW AMBULATORY MEDICATION CHARGE

## 2024-09-05 NOTE — PROGRESS NOTES
PT mostly kept herself isolated and withdrawn to her room. PT emerged from her room for meals and snacks. PT may be anxious about her surroundings. PT verbalized she didn't need to be here and she says she does not have a mental health issue. PT verbalized this when offered to go to groups. PT refused groups. PT mood is nominal. PT ate a poor portion of her dinner. Pt was given encouragement and emotional support. Continue to monitor.   Refer to group note

## 2024-09-05 NOTE — TELEPHONE ENCOUNTER
I left message for patient to return my call. Office received paper work by fax. Provider asking what is this for.

## 2024-09-05 NOTE — GROUP NOTE
Group Topic: Dialectical Behavioral Therapy - Mindfulness   Group Date: 9/5/2024-[facility]  Start Time:  9:00 AM  End Time: 10:00 AM  Facilitators: CORNELL Gandhi   [facility]    Number of Participants: 6   Treatment Modality: Dialectical Behavioral Therapy  Interventions utilized were group exercise  Purpose: coping skills  Comment: Mindfulness and gratitude    Group began with a meditative reading and pts. were encouraged to reflect and respond.  This was followed by a brief explanation of the potential benefits of mindfulness.  An experiential exercise was completed and afterwards members were asked to share a non-judgmental observation about their experience as well as a daily gratitude.   Increased awareness of multiple thoughts.   Name: Autumn Hyde YOB: 1980   MR: [id number]      Level of Participation: when cued  Quality of Participation: attentive  Mood/Affect: appropriate  Progress: Moderate  Plan: continue with services

## 2024-09-05 NOTE — GROUP NOTE
Group Topic: Chemical Dependency - AA   Group Date: 9/5/2024-IOP  Start Time: 10:00 AM  End Time: 11:00 AM  Facilitators: CORNELL Gandhi   [facility]    Number of Participants: 6   Treatment Modality: Psychoeducation  Interventions utilized were patient education  Purpose: coping skills and insight or knowledge  Comment: 12 step orientation    Goal of this session is to increase awareness of 12 step programs and facilitate a 'warm handoff' in hopes to increase connection of [ts to AA/NA. Educate about and normalize recovery process. A volunteer shared about their personal recovery and experiences within a 12 step program. Components of this were reviewed. Members were given opportunity to ask questions.   Quiet but held eye contact throughout.   Name: Autumn Hyde YOB: 1980   MR: [id number]      Level of Participation: minimal  Quality of Participation: attentive  Mood/Affect: appropriate  Progress: Moderate  Plan: continue with services

## 2024-09-05 NOTE — GROUP NOTE
Group Topic: Chemical Dependency - Relapse   Group Date: 9/5/2024 -IOP  Start Time: 11:00 AM  End Time: 12:00 PM  Facilitators: CORNELL Gandhi   [facility]    Number of Participants: 6   Treatment Modality: [facility] [place] Therapy, Patient-Centered Therapy, and Psychoeducation  Interventions utilized were exploration, patient education, and support  Purpose: coping skills, insight or knowledge, self-care, relapse prevention strategies, and trigger / craving management  Comment: Group Counseling  Group began with a meditative reading about connecting with nature for relaxation/healing and pts. were encouraged to reflect and respond. Members were encouraged to provide the group with an update regarding their recovery progress, as well as discuss shared experiences and upcoming plans/relapse prevention.   A coin ceremony was held for one member who completed IOP today.   Prompted to introduce self to the group again and share what brought her to treatment. She generally informed the group about the incident, but rationalized and defended the incident/behaviors. High level of denial blaming her sleep problems.  discarded marijuana. Shared a family history of alcoholism and how this motivated her to not develop a drinking problem, though she still drank. Limited understanding of the disease.   Name: Autumn Hyde YOB: 1980   MR: [id number]      Level of Participation: moderate  Quality of Participation: attentive, cooperative, and defensive  Mood/Affect: appropriate; irritated  Progress: Gaining insight or knowledge  Plan: continue with services

## 2024-09-06 LAB
1OH-MIDAZOLAM UR CFM-MCNC: <25 NG/ML
6MAM UR CFM-MCNC: <25 NG/ML
7AMINOCLONAZEPAM UR CFM-MCNC: <25 NG/ML
A-OH ALPRAZ UR CFM-MCNC: <25 NG/ML
ALPRAZ UR CFM-MCNC: <25 NG/ML
CARBOXYTHC UR-MCNC: >500 NG/ML
CHLORDIAZEP UR CFM-MCNC: <25 NG/ML
CLONAZEPAM UR CFM-MCNC: <25 NG/ML
CODEINE UR CFM-MCNC: <50 NG/ML
DIAZEPAM UR CFM-MCNC: <25 NG/ML
HYDROCODONE CTO UR CFM-MCNC: <25 NG/ML
HYDROMORPHONE UR CFM-MCNC: <25 NG/ML
LORAZEPAM UR CFM-MCNC: <25 NG/ML
MIDAZOLAM UR CFM-MCNC: <25 NG/ML
MORPHINE UR CFM-MCNC: <50 NG/ML
NORDIAZEPAM UR CFM-MCNC: <25 NG/ML
NORHYDROCODONE UR CFM-MCNC: <25 NG/ML
NOROXYCODONE UR CFM-MCNC: <25 NG/ML
OXAZEPAM UR CFM-MCNC: <25 NG/ML
OXYCODONE UR CFM-MCNC: <25 NG/ML
OXYMORPHONE UR CFM-MCNC: <25 NG/ML
TEMAZEPAM UR CFM-MCNC: <25 NG/ML

## 2024-09-06 RX ORDER — CITALOPRAM 10 MG/1
10 TABLET ORAL DAILY
Qty: 30 TABLET | Refills: 0 | Status: SHIPPED | OUTPATIENT
Start: 2024-09-06

## 2024-09-06 NOTE — TELEPHONE ENCOUNTER
Last DOS 7-30-24 next DOS 1-28-25 pt asked RX be sent to [facility] [place].

## 2024-09-07 LAB — ETHYL GLUCURONIDE UR QL SCN: NEGATIVE NG/ML

## 2024-09-09 ENCOUNTER — MULTIDISCIPLINARY VISIT (OUTPATIENT)
Dept: BEHAVIORAL HEALTH | Facility: CLINIC | Age: 44
End: 2024-09-09
Payer: COMMERCIAL

## 2024-09-09 DIAGNOSIS — F12.20 CANNABIS USE DISORDER, MODERATE (MULTI): Primary | ICD-10-CM

## 2024-09-09 LAB — CARBOXYTHC UR-MCNC: 255 NG/ML

## 2024-09-09 PROCEDURE — 90853 GROUP PSYCHOTHERAPY: CPT

## 2024-09-09 NOTE — GROUP NOTE
Group Topic: Chemical Dependency - Relapse   Group Date: 9/9/2024  Start Time: 11:00 AM  End Time: 12:00 PM  Facilitators: Alexei Wood [facility]   Department: [facility]    Number of Participants: 7   Treatment Modality: Cognitive Behavioral Therapy and Dialectical Behavioral Therapy  Interventions utilized were exploration, reality testing, and support  Purpose: feelings, communication skills, and trigger / craving management  Comment: Group therapy with members of the IOP, and aftercare levels of treatment.  Started the group with a reading from “Body, Mind, and Spirit.”  The reading talked about finding our focus in recovery.  Group members shared what they could relate to from today's reading.  Group members then shared recovery updates including any recent and/or current sobriety threats.    Name: Autumn Hyde YOB: 1980   MR: [id number]      Level of Participation: active  Quality of Participation: appropriate/pleasant, attentive, cooperative, and engaged  Mood/Affect: appropriate  Progress: Gaining insight or knowledge.  Patient was active and engaged in today's group therapy session.  She shared with the group about frustrations regarding the unfairness of situations, particularly involving large institutions.  This was in support of a fellow group member.  Patient shared a specific example from her past.  The point was made that regardless of what happens to us, we all have to be careful in our responses, as our responses can further aggravate the issue or even lead to a relapse.  The group was very supportive.  Patient reports abstinence from mind/mood altering substances.    Plan: continue with services.  Patient will [facility] with [facility] services.

## 2024-09-09 NOTE — GROUP NOTE
Group Topic: Dialectical Behavioral Therapy - Mindfulness   Group Date: 9/9/2024  Start Time:  9:00 AM  End Time: 10:00 AM  Facilitators: DENISE Arana   Department: [facility]    Number of Participants: 4   Treatment Modality: Dialectical Behavioral Therapy  Interventions utilized were exploration, group exercise, and support  Purpose: feelings, communication skills, insight or knowledge, and trigger / craving management  Comment: Patient participated in a mindfulness meditation exercise and a group check-in.  The purpose and benefits of mindfulness meditation in strengthening relapse prevention skills was explained.  A mindfulness meditation exercise was conducted, and patient provided feedback in the form of personal observations from the exercise.    Name: Autumn Hyde YOB: 1980   MR: [id number]      Level of Participation: when cued  Quality of Participation: appropriate/pleasant, attentive, cooperative, and engaged  Mood/Affect: appropriate  Progress: Gaining insight or knowledge  Plan: continue with services.  Patient will continue with IOP services.

## 2024-09-09 NOTE — GROUP NOTE
Group Topic: Personal Responsibility   Group Date: 9/9/2024  Start Time: 10:00 AM  End Time: 11:00 AM  Facilitators: DENISE Gray   Department:  [facility]    Number of Participants: 4   Group Focus: acceptance  Treatment Modality: Cognitive Behavioral Therapy and Psychoeducation  Interventions utilized were patient education  Purpose: maladaptive thinking and insight or knowledge    Name: Autumn Hyde YOB: 1980   MR: [id number]      Facilitator: Licensed Professional Counselor  Level of Participation: active  Quality of Participation: appropriate/pleasant  Interactions with others: appropriate  Mood/Affect: brightens with interaction  Triggers (if applicable): NA  Cognition: coherent/clear  Progress: Moderate  Comments: Patient participated in step one assignment group session. Frederic about disease concept of addiction.    Plan: continue with services

## 2024-09-10 ENCOUNTER — APPOINTMENT (OUTPATIENT)
Dept: PRIMARY CARE | Facility: CLINIC | Age: 44
End: 2024-09-10
Payer: COMMERCIAL

## 2024-09-10 VITALS
HEIGHT: 63 IN | BODY MASS INDEX: 32.85 KG/M2 | DIASTOLIC BLOOD PRESSURE: 82 MMHG | TEMPERATURE: 98.1 F | SYSTOLIC BLOOD PRESSURE: 126 MMHG | OXYGEN SATURATION: 98 % | HEART RATE: 112 BPM | WEIGHT: 185.4 LBS

## 2024-09-10 DIAGNOSIS — K21.9 GASTROESOPHAGEAL REFLUX DISEASE, UNSPECIFIED WHETHER ESOPHAGITIS PRESENT: Primary | ICD-10-CM

## 2024-09-10 DIAGNOSIS — F19.90 SUBSTANCE USE DISORDER: ICD-10-CM

## 2024-09-10 DIAGNOSIS — F32.5 MAJOR DEPRESSIVE DISORDER IN FULL REMISSION, UNSPECIFIED WHETHER RECURRENT (CMS-HCC): ICD-10-CM

## 2024-09-10 PROBLEM — R06.2 WHEEZING: Status: RESOLVED | Noted: 2024-07-03 | Resolved: 2024-09-10

## 2024-09-10 LAB
1OH-MIDAZOLAM UR CFM-MCNC: <25 NG/ML
6MAM UR CFM-MCNC: <25 NG/ML
7AMINOCLONAZEPAM UR CFM-MCNC: <25 NG/ML
A-OH ALPRAZ UR CFM-MCNC: <25 NG/ML
ALPRAZ UR CFM-MCNC: <25 NG/ML
CHLORDIAZEP UR CFM-MCNC: <25 NG/ML
CLONAZEPAM UR CFM-MCNC: <25 NG/ML
CODEINE UR CFM-MCNC: <50 NG/ML
DIAZEPAM UR CFM-MCNC: <25 NG/ML
HYDROCODONE CTO UR CFM-MCNC: <25 NG/ML
HYDROMORPHONE UR CFM-MCNC: <25 NG/ML
LORAZEPAM UR CFM-MCNC: <25 NG/ML
MIDAZOLAM UR CFM-MCNC: <25 NG/ML
MORPHINE UR CFM-MCNC: <50 NG/ML
NORDIAZEPAM UR CFM-MCNC: <25 NG/ML
NORHYDROCODONE UR CFM-MCNC: <25 NG/ML
NOROXYCODONE UR CFM-MCNC: <25 NG/ML
OXAZEPAM UR CFM-MCNC: <25 NG/ML
OXYCODONE UR CFM-MCNC: <25 NG/ML
OXYMORPHONE UR CFM-MCNC: <25 NG/ML
TEMAZEPAM UR CFM-MCNC: <25 NG/ML

## 2024-09-10 PROCEDURE — 99213 OFFICE O/P EST LOW 20 MIN: CPT | Performed by: NURSE PRACTITIONER

## 2024-09-10 PROCEDURE — 1036F TOBACCO NON-USER: CPT | Performed by: NURSE PRACTITIONER

## 2024-09-10 PROCEDURE — 3008F BODY MASS INDEX DOCD: CPT | Performed by: NURSE PRACTITIONER

## 2024-09-10 ASSESSMENT — PATIENT HEALTH QUESTIONNAIRE - PHQ9
1. LITTLE INTEREST OR PLEASURE IN DOING THINGS: NOT AT ALL
SUM OF ALL RESPONSES TO PHQ9 QUESTIONS 1 AND 2: 0
2. FEELING DOWN, DEPRESSED OR HOPELESS: NOT AT ALL

## 2024-09-10 ASSESSMENT — LIFESTYLE VARIABLES
HOW OFTEN DURING THE LAST YEAR HAVE YOU FAILED TO DO WHAT WAS NORMALLY EXPECTED FROM YOU BECAUSE OF DRINKING: NEVER
HOW OFTEN DURING THE LAST YEAR HAVE YOU HAD A FEELING OF GUILT OR REMORSE AFTER DRINKING: NEVER
HOW OFTEN DO YOU HAVE SIX OR MORE DRINKS ON ONE OCCASION: NEVER
AUDIT TOTAL SCORE: 1
HAS A RELATIVE, FRIEND, DOCTOR, OR ANOTHER HEALTH PROFESSIONAL EXPRESSED CONCERN ABOUT YOUR DRINKING OR SUGGESTED YOU CUT DOWN: NO
HOW OFTEN DURING THE LAST YEAR HAVE YOU BEEN UNABLE TO REMEMBER WHAT HAPPENED THE NIGHT BEFORE BECAUSE YOU HAD BEEN DRINKING: NEVER
AUDIT-C TOTAL SCORE: 1
HAVE YOU OR SOMEONE ELSE BEEN INJURED AS A RESULT OF YOUR DRINKING: NO
HOW OFTEN DURING THE LAST YEAR HAVE YOU FOUND THAT YOU WERE NOT ABLE TO STOP DRINKING ONCE YOU HAD STARTED: NEVER
HOW OFTEN DO YOU HAVE A DRINK CONTAINING ALCOHOL: MONTHLY OR LESS
HOW OFTEN DURING THE LAST YEAR HAVE YOU NEEDED AN ALCOHOLIC DRINK FIRST THING IN THE MORNING TO GET YOURSELF GOING AFTER A NIGHT OF HEAVY DRINKING: NEVER
SKIP TO QUESTIONS 9-10: 1
HOW MANY STANDARD DRINKS CONTAINING ALCOHOL DO YOU HAVE ON A TYPICAL DAY: 1 OR 2

## 2024-09-10 ASSESSMENT — ENCOUNTER SYMPTOMS
BACK PAIN: 0
NERVOUS/ANXIOUS: 0
BLOOD IN STOOL: 0
APPETITE CHANGE: 0
OCCASIONAL FEELINGS OF UNSTEADINESS: 0
DYSURIA: 0
WEAKNESS: 0
CONSTIPATION: 0
SORE THROAT: 0
LOSS OF SENSATION IN FEET: 0
JOINT SWELLING: 0
WHEEZING: 0
DIARRHEA: 0
SHORTNESS OF BREATH: 0
ARTHRALGIAS: 0
ABDOMINAL PAIN: 0
PHOTOPHOBIA: 0
COUGH: 0
ADENOPATHY: 0
PALPITATIONS: 0
ACTIVITY CHANGE: 0
EYE DISCHARGE: 0
DIZZINESS: 0
TREMORS: 0
HEADACHES: 0
HEMATURIA: 0
DEPRESSION: 0
BRUISES/BLEEDS EASILY: 0
AGITATION: 0
SINUS PAIN: 0

## 2024-09-10 ASSESSMENT — PAIN SCALES - GENERAL: PAINLEVEL: 4

## 2024-09-10 NOTE — PATIENT INSTRUCTIONS
Continue IOP treatment  Consider Trazodone  for sleep.  Check with IOP and let me know what they say  Focus on healthy eating including lean proteins and vegetables.  Avoid high carbohydrate high sugar foods and drinks.  Try to increase physical activity as tolerated.  Goal is for 160 minutes/week of physical activity.

## 2024-09-10 NOTE — PROGRESS NOTES
Subjective   Patient ID: Autumn Hyde is a 44 y.o. female who presents for Follow-up (Pt present to have [facility] paperwork completed.She had a fall on Sunday night pain with her left ankle.She was feeling light headed earlier in the shower. She was stacking wood.She was also stung on both legs by bees. She ran over them while mowing the grass last Friday.).    Presents today to follow-up after testing positive at work for marijuana.  She reports that she routinely smoked marijuana prior to bed to help with her sleep.  She states she was at work somebody did they smelled marijuana and she was forced to test.  She tested positive and has initiated IOP treatment.  She goes to the facility in [place] 3 times a week.  It last from 9 AM to 12 PM on the days.  Takes her 1 hour to drive each way to and from treatment.  Her current job is at the [employer] in [place] and she is a .   She reports she has not smoked since testing.  She denies cravings.  She denies side effects from withdrawal.  She does however continue to have issues with trying to sleep.  She reports she has tried melatonin and multiple over-the-counter medications without improvement in her ability to sleep.  She states IOP asked her to check with her provider to see what they recommended and to verify with them if it is okay for her to take this medication.  I would like her to try Trazodone for sleep.  She will check with IOP and determine if this is allowable.  Incidentally she reports that on Friday she was stung by 2 bees.  She states the areas were initially red swollen and warm to touch.  She states they are improving in size and no longer warm to touch.         Review of Systems   Constitutional:  Negative for activity change and appetite change.   HENT:  Negative for congestion, ear pain, hearing loss, sinus pain and sore throat.    Eyes:  Negative for photophobia, discharge and visual disturbance.   Respiratory:   "Negative for cough, shortness of breath and wheezing.    Cardiovascular:  Negative for chest pain, palpitations and leg swelling.   Gastrointestinal:  Negative for abdominal pain, blood in stool, constipation and diarrhea.   Endocrine: Negative for cold intolerance, heat intolerance and polyuria.   Genitourinary:  Negative for dysuria and hematuria.   Musculoskeletal:  Negative for arthralgias, back pain and joint swelling.   Skin:  Negative for rash.   Allergic/Immunologic: Negative for environmental allergies and food allergies.   Neurological:  Negative for dizziness, tremors, syncope, weakness and headaches.   Hematological:  Negative for adenopathy. Does not bruise/bleed easily.   Psychiatric/Behavioral:  Negative for agitation and suicidal ideas. The patient is not nervous/anxious.        Objective   /82   Pulse (!) 112   Temp 36.7 °C (98.1 °F) (Temporal)   Ht 1.6 m (5' 3\")   Wt 84.1 kg (185 lb 6.4 oz)   SpO2 98%   BMI 32.84 kg/m²     Physical Exam  Vitals reviewed.   Constitutional:       General: She is not in acute distress.     Appearance: Normal appearance.   HENT:      Head: Normocephalic.      Right Ear: Tympanic membrane normal.      Left Ear: Tympanic membrane normal.      Nose: Nose normal.      Mouth/Throat:      Mouth: Mucous membranes are moist.   Eyes:      Conjunctiva/sclera: Conjunctivae normal.      Pupils: Pupils are equal, round, and reactive to light.   Cardiovascular:      Rate and Rhythm: Normal rate and regular rhythm.      Pulses: Normal pulses.      Heart sounds: Normal heart sounds.   Pulmonary:      Effort: Pulmonary effort is normal.      Breath sounds: Normal breath sounds.   Abdominal:      General: Bowel sounds are normal.      Palpations: Abdomen is soft.   Musculoskeletal:         General: Normal range of motion.   Skin:     General: Skin is warm and dry.      Capillary Refill: Capillary refill takes less than 2 seconds.   Neurological:      Mental Status: She is " alert and oriented to person, place, and time.   Psychiatric:         Mood and Affect: Mood normal.         Speech: Speech normal.         Assessment/Plan   Problem List Items Addressed This Visit             ICD-10-CM    GERD (gastroesophageal reflux disease) - Primary K21.9

## 2024-09-11 ENCOUNTER — MULTIDISCIPLINARY VISIT (OUTPATIENT)
Dept: BEHAVIORAL HEALTH | Facility: CLINIC | Age: 44
End: 2024-09-11
Payer: COMMERCIAL

## 2024-09-11 ENCOUNTER — PATIENT MESSAGE (OUTPATIENT)
Dept: PRIMARY CARE | Facility: CLINIC | Age: 44
End: 2024-09-11

## 2024-09-11 DIAGNOSIS — F12.10 CANNABIS USE DISORDER, MILD: ICD-10-CM

## 2024-09-11 DIAGNOSIS — G47.00 INSOMNIA, UNSPECIFIED TYPE: Primary | ICD-10-CM

## 2024-09-11 PROCEDURE — 80349 CANNABINOIDS NATURAL: CPT

## 2024-09-11 PROCEDURE — 90853 GROUP PSYCHOTHERAPY: CPT

## 2024-09-11 PROCEDURE — 80307 DRUG TEST PRSMV CHEM ANLYZR: CPT

## 2024-09-11 NOTE — GROUP NOTE
Group Topic: Chemical Dependency - Primary Disease   Group Date: 9/11/2024 -[facility]  Start Time: 10:00 AM  End Time: 11:00 PM  Facilitators: CORNELL Gandhi   [facility]    Number of Participants: 2   Treatment Modality: Psychoeducation  Interventions utilized were clarification and patient education  Purpose: insight or knowledge  Comment:   Disease Concept/Neurobiology Pt. 1  Participants viewed video presented by Dr. April Cueto ['[place]'] focused on neurobiology of addiction [role of genetics, dopamine and glutamate, etc.]. Purpose of video/discussion is to increase understanding of disease concept and application of this information to addiction/recovery management. Follow up with review of content and discussion of personal application.   Making eye contact and voiced understanding of the content.   Name: Autumn Hyde YOB: 1980   MR: [id number]      Level of Participation: active  Quality of Participation: attentive  Mood/Affect: bright  Progress: Gaining insight or knowledge  Plan: continue with services

## 2024-09-11 NOTE — CARE PLAN
Problem: D5 Substance free life: Lifestyle which reinforces maintenance of chemical dependency  Goal: STG Patient will discuss key principals of addictive disease and relate them to their personal experience by end of first week of treatment program  Outcome: Progressing  Goal: STG Patient will identify three personal reinforcers of chemical dependency by end of second week of treatment program  Outcome: Progressing  Goal: LTG Patient will establish a structured recovery peer support program during course of treatment program  Outcome: Progressing  Goal: LTG Patient will develop a written plan for continuing treatment post discharge during last scheduled week of treatment  Outcome: Progressing

## 2024-09-11 NOTE — GROUP NOTE
Group Topic: Dialectical Behavioral Therapy - Mindfulness   Group Date: 9/11/2024  Start Time:  9:00 AM  End Time: 10:00 AM  Facilitators: DENISE Arana   Department: [facility]    Number of Participants: 2   Treatment Modality: Dialectical Behavioral Therapy  Interventions utilized were exploration, group exercise, and support  Purpose: feelings, communication skills, insight or knowledge, and trigger / craving management  Comment: Patient participated in a mindfulness meditation exercise and a group check-in.  The purpose and benefits of mindfulness meditation in strengthening relapse prevention skills was explained.  A mindfulness meditation exercise was conducted, and patient provided feedback in the form of personal observations from the exercise.    Name: Autumn Hyde YOB: 1980   MR: [id number]      Level of Participation: when cued  Quality of Participation: appropriate/pleasant, attentive, cooperative, and engaged  Mood/Affect: appropriate  Progress: Gaining insight or knowledge  Plan: continue with services

## 2024-09-11 NOTE — GROUP NOTE
Group Topic: Chemical Dependency - Relapse   Group Date: 9/11/2024  Start Time: 11:00 AM  End Time: 12:00 PM  Facilitators: DNEISE Arana   Department: [facility]    Number of Participants: 2   Treatment Modality: Cognitive Behavioral Therapy and Dialectical Behavioral Therapy  Interventions utilized were exploration, reality testing, and support  Purpose: feelings, communication skills, and trigger / craving management  Comment: Group therapy with members of the [facility] level of treatment.  Started the group with a reading from “Body, Mind, and Spirit.”  The reading talked about the importance of consistency and perseverance in recovery.  Group members shared what they could relate to from today's reading.  Group members then provided recovery updates including any recent and/or current sobriety threats.       Name: Autumn Hyde YOB: 1980   MR: [id number]      Level of Participation: active  Quality of Participation: appropriate/pleasant, attentive, cooperative, and engaged  Mood/Affect: appropriate  Progress: Gaining insight or knowledge.  Patient was active and engaged in today's group therapy session.  Patient shared about various stressors in her life.  The group provided feedback and support to her regarding her personal stress.  Patient reports current abstinence from mind/mood altering substances.    Plan: continue with services

## 2024-09-12 ENCOUNTER — MULTIDISCIPLINARY VISIT (OUTPATIENT)
Dept: BEHAVIORAL HEALTH | Facility: CLINIC | Age: 44
End: 2024-09-12
Payer: COMMERCIAL

## 2024-09-12 ENCOUNTER — PHARMACY VISIT (OUTPATIENT)
Dept: PHARMACY | Facility: CLINIC | Age: 44
End: 2024-09-12
Payer: COMMERCIAL

## 2024-09-12 DIAGNOSIS — F12.10 CANNABIS USE DISORDER, MILD: ICD-10-CM

## 2024-09-12 PROCEDURE — 90853 GROUP PSYCHOTHERAPY: CPT

## 2024-09-12 RX ORDER — TRAZODONE HYDROCHLORIDE 50 MG/1
50 TABLET ORAL NIGHTLY PRN
Qty: 30 TABLET | Refills: 2 | Status: SHIPPED | OUTPATIENT
Start: 2024-09-12 | End: 2025-09-12

## 2024-09-12 NOTE — GROUP NOTE
Group Topic: Chemical Dependency - Relapse   Group Date: 9/12/2024-[facility]  Start Time: 11:00 AM  End Time: 12:00 PM  Facilitators: CORNELL Gandhi   [facility]    Number of Participants: 3   Treatment Modality: Psychoeducation and Other: Relapse Prevention   Interventions utilized were exploration, patient education, and support  Purpose: coping skills, feelings, communication skills, relapse prevention strategies, and trigger / craving management  Comment: Group Counseling     Group began with a meditative reading and pts. were encouraged to reflect and respond. Members were encouraged to provide the group with an update regarding their recovery progress, as well as discuss shared experiences and observations.    Responded to the reading by acknowledging need for acceptance vs blame etc. Struggles with this sharing various examples of where she has felt treated unfairly. She belies that cannabis was not problematic because she had periods of abstinence. Discussed this myth. Very tearful. Group encouraged to bring family support for family focused education next week. Has followed up with PCP re: depression and sleep disturbance.   Name: Autumn Hyde YOB: 1980   MR: [id number]      Level of Participation: active  Quality of Participation: engaged  Mood/Affect: agitated, anxious, and tearful  Progress: Moderate  Plan: continue with services

## 2024-09-12 NOTE — GROUP NOTE
Group Topic: Chemical Dependency - Primary Disease   Group Date: 9/12/2024-[facility]  Start Time: 10:00 AM  End Time: 11:00 AM  Facilitators: CORNELL Gandhi   [facility]    Number of Participants: 3   Treatment Modality: Psychoeducation  Interventions utilized were clarification and patient education  Purpose: insight or knowledge, relapse prevention strategies, and trigger / craving management  Comment: Neurobiology/Labor pt. 2  Pt. 2 of Dr. Cueto's video regarding benefits of recovery, applicable to working a 12 step program and further explaining how recovery changes the brain. Patients viewed second portion of video presentation by Dr. April Cueto, addiction psychiatrist, covering this content. The content of the first portion of the video [disease/neurobiology] , previously viewed by several group members, was reviewed through education and group discussion as well. Review of key concepts and application of these held afterwards.   Actively listening with good eye contact. No questions. High levels of denial or possibly lack of education regarding the disease.   Name: Autumn Hyde YOB: 1980   MR: [id number]      Level of Participation: minimal  Quality of Participation: attentive  Mood/Affect: appropriate  Progress: Moderate  Plan: continue with services

## 2024-09-12 NOTE — GROUP NOTE
Group Topic: Dialectical Behavioral Therapy - Mindfulness   Group Date: 9/12/2024 -[facility]  Start Time:  9:00 AM  End Time: 10:00 AM  Facilitators: CORNELL Gandhi   [facility]    Number of Participants: 3   Treatment Modality: Dialectical Behavioral Therapy  Interventions utilized were group exercise and patient education  Purpose: coping skills and relapse prevention strategies  Comment: Mindfulness and Recovery Check in    Group began with a meditative reading and pts. were encouraged to reflect and respond.  This was followed by a brief explanation of the potential benefits of mindfulness.  An experiential exercise was completed and afterwards members were asked to share a non-judgmental observation about their experience as well as a daily gratitude. Members shared an update regarding recovery progress.   Responded to the reading by sharing significant long standing communication struggles within the family. Talked about acceptance as one means of managing these triggers.   Name: Autumn Hyde YOB: 1980   MR: [id number]      Level of Participation: active  Quality of Participation: engaged  Mood/Affect: tearful  Progress: Moderate  Plan: continue with services

## 2024-09-13 LAB — ETHYL GLUCURONIDE UR QL SCN: NEGATIVE NG/ML

## 2024-09-15 LAB — CARBOXYTHC UR-MCNC: 364 NG/ML

## 2024-09-16 ENCOUNTER — MULTIDISCIPLINARY VISIT (OUTPATIENT)
Dept: BEHAVIORAL HEALTH | Facility: CLINIC | Age: 44
End: 2024-09-16
Payer: COMMERCIAL

## 2024-09-16 DIAGNOSIS — F12.20 CANNABIS USE DISORDER, MODERATE: Primary | ICD-10-CM

## 2024-09-16 LAB
1OH-MIDAZOLAM UR CFM-MCNC: <25 NG/ML
6MAM UR CFM-MCNC: <25 NG/ML
7AMINOCLONAZEPAM UR CFM-MCNC: <25 NG/ML
A-OH ALPRAZ UR CFM-MCNC: <25 NG/ML
ALPRAZ UR CFM-MCNC: <25 NG/ML
AMPHETAMINES UR QL SCN: ABNORMAL
BARBITURATES UR QL SCN: ABNORMAL
BENZODIAZ UR QL SCN: ABNORMAL
BZE UR QL SCN: ABNORMAL
CANNABINOIDS UR QL SCN: ABNORMAL
CHLORDIAZEP UR CFM-MCNC: <25 NG/ML
CLONAZEPAM UR CFM-MCNC: <25 NG/ML
CODEINE UR CFM-MCNC: <50 NG/ML
DIAZEPAM UR CFM-MCNC: <25 NG/ML
FENTANYL+NORFENTANYL UR QL SCN: ABNORMAL
HYDROCODONE CTO UR CFM-MCNC: <25 NG/ML
HYDROMORPHONE UR CFM-MCNC: <25 NG/ML
LORAZEPAM UR CFM-MCNC: <25 NG/ML
METHADONE UR QL SCN: ABNORMAL
MIDAZOLAM UR CFM-MCNC: <25 NG/ML
MORPHINE UR CFM-MCNC: <50 NG/ML
NORDIAZEPAM UR CFM-MCNC: <25 NG/ML
NORHYDROCODONE UR CFM-MCNC: <25 NG/ML
NOROXYCODONE UR CFM-MCNC: <25 NG/ML
OPIATES UR QL SCN: ABNORMAL
OXAZEPAM UR CFM-MCNC: <25 NG/ML
OXYCODONE UR CFM-MCNC: <25 NG/ML
OXYCODONE+OXYMORPHONE UR QL SCN: ABNORMAL
OXYMORPHONE UR CFM-MCNC: <25 NG/ML
PCP UR QL SCN: ABNORMAL
TEMAZEPAM UR CFM-MCNC: <25 NG/ML

## 2024-09-16 PROCEDURE — 90853 GROUP PSYCHOTHERAPY: CPT

## 2024-09-16 PROCEDURE — 80307 DRUG TEST PRSMV CHEM ANLYZR: CPT

## 2024-09-16 PROCEDURE — 80365 DRUG SCREENING OXYCODONE: CPT

## 2024-09-16 PROCEDURE — 80346 BENZODIAZEPINES1-12: CPT

## 2024-09-16 PROCEDURE — 80349 CANNABINOIDS NATURAL: CPT

## 2024-09-16 NOTE — PROGRESS NOTES
Level of Care Assessment:  D1: Acute Intx/Withdrawal Potential: 1  D2: Biomedical Conditions/Complications: 1  D3: Emtional Behavioral/Cog. Conditions Complications: 2  D4: Treatment Acceptance/Resistance: 2  D5: Relapse/Cont. Use/Cont. Problem Potential: 2  D6: Recovery Environment: 2    Reports abstinence. No family attendance as of yet. No 12 step meeting attendance as of yet. Referred to PCP for sleep disturbance as PCP is managing her antidepressants.   RASHID SARABIA, [facility]

## 2024-09-16 NOTE — GROUP NOTE
Group Topic: Chemical Dependency - Relapse   Group Date: 9/16/2024  Start Time: 11:00 AM  End Time: 12:00 PM  Facilitators: DENISE Arana   Department: [facility]    Number of Participants: 4   Treatment Modality: Cognitive Behavioral Therapy and Dialectical Behavioral Therapy  Interventions utilized were exploration, reality testing, and support  Purpose: feelings, communication skills, and trigger / craving management  Comment: Group therapy with patients from the IOP and aftercare levels of treatment.  Started the group with a reading from “Body, Mind, and Spirit.”  The reading talked about the danger of impatience in early recovery.  Group members shared what they could relate to from today's reading.  Group members then shared recovery updates including any recent and/or current sobriety threats.      Name: Autumn Hyde YOB: 1980   MR: [id number]      Level of Participation: active  Quality of Participation: appropriate/pleasant, attentive, cooperative, and engaged  Mood/Affect: appropriate  Progress: Gaining insight or knowledge.  Patient was active and engaged in today's group therapy session.  Addiction is a stress induced illness.  Patient shared with the group about some of her present personal stressors.  The group was very supportive.  Patient reports ongoing abstinence from mind/mood altering substances.    Plan: continue with services.  Patient will continue with [facility] services.

## 2024-09-16 NOTE — GROUP NOTE
Group Topic: Dialectical Behavioral Therapy - Mindfulness   Group Date: 9/16/2024  Start Time:  9:00 AM  End Time: 10:00 AM  Facilitators: DENISE Arana   Department: [facility]    Number of Participants: 2   Treatment Modality: Dialectical Behavioral Therapy  Interventions utilized were exploration, group exercise, and support  Purpose: feelings, communication skills, insight or knowledge, and trigger / craving management  Comment: Patient participated in a mindfulness meditation exercise and a group check-in.  The purpose and benefits of mindfulness meditation in strengthening relapse prevention skills was explained.  A mindfulness meditation exercise was conducted, and patient provided feedback in the form of personal observations from the exercise.    Name: Autumn Hyde YOB: 1980   MR: [id number]      Level of Participation: when cued  Quality of Participation: appropriate/pleasant, attentive, cooperative, and engaged  Mood/Affect: appropriate  Progress: Gaining insight or knowledge  Plan: continue with services.  Patient will continue with IOP services.

## 2024-09-16 NOTE — GROUP NOTE
Group Topic: Feeling Awareness/Expression   Group Date: 9/16/2024 -[facility]  Start Time: 10:00 AM  End Time: 11:00 AM  Facilitators: CORNELL Gandhi   [facility]    Number of Participants: 2   Treatment Modality: Cognitive Behavioral Therapy  Interventions utilized were patient education  Purpose: coping skills, maladaptive thinking, and feelings  Comment:   Emotions and Addiction Recovery    Goal of this session is to educate about the role of emotions and their relationship with addiction recovery. A power point presentation was reviewed and handouts were distributed. Discussion encouraged throughout to encourage members to self relate and identify their personal emotional responses and identification f motions. Overview of CBT completed and several examples  used to apply the education content.     Reports she had a ' migraine all weekend' and the only thing that helps is Vicodin. Has not attended a meeting because 'she wasn't allowed to use the car.' She 'locked myself in my room all weekend'. Encouraged her to attend online regardless of her motivation and mood. Able to identify using cannabis to cope but still feels she primarily used it for sleep.   Reports she is not sleeping and is now sleepy today. Referred her back to her PCP to discuss recently prescribed Trazodone and sleep disturbance. No use reported at this time.       Name: Autumn Hyde YOB: 1980   MR: [id number]      Level of Participation: moderate  Quality of Participation: attentive  Mood/Affect: depressed and flat  Progress: Minimal  Plan: continue with services

## 2024-09-17 NOTE — CARE PLAN
Problem: D5 Substance free life: Lifestyle which reinforces maintenance of chemical dependency  Goal: LTG Patient will develop a written plan for continuing treatment post discharge during last scheduled week of treatment  Outcome: Not Progressing-pending until further along in treatment      Problem: D5 Substance free life: Lifestyle which reinforces maintenance of chemical dependency  Goal: STG Patient will discuss key principals of addictive disease and relate them to their personal experience by end of first week of treatment program  Outcome: Progressing  Goal: STG Patient will identify three personal reinforcers of chemical dependency by end of second week of treatment program  Outcome: Progressing  Goal: LTG Patient will establish a structured recovery peer support program during course of treatment program  Outcome: Progressing

## 2024-09-17 NOTE — CARE PLAN
Problem: D5 Substance free life: Lifestyle which reinforces maintenance of chemical dependency  Goal: LTG Patient will develop a written plan for continuing treatment post discharge during last scheduled week of treatment  Outcome: Not Progressing-Pending      Problem: D5 Substance free life: Lifestyle which reinforces maintenance of chemical dependency  Goal: STG Patient will discuss key principals of addictive disease and relate them to their personal experience by end of first week of treatment program  Outcome: Progressing  Goal: STG Patient will identify three personal reinforcers of chemical dependency by end of second week of treatment program  Outcome: Progressing  Goal: LTG Patient will establish a structured recovery peer support program during course of treatment program  Outcome: Progressing

## 2024-09-18 ENCOUNTER — MULTIDISCIPLINARY VISIT (OUTPATIENT)
Dept: BEHAVIORAL HEALTH | Facility: CLINIC | Age: 44
End: 2024-09-18
Payer: COMMERCIAL

## 2024-09-18 DIAGNOSIS — F12.20 CANNABIS USE DISORDER, MODERATE: ICD-10-CM

## 2024-09-18 LAB — ETHYL GLUCURONIDE UR QL SCN: NEGATIVE NG/ML

## 2024-09-18 PROCEDURE — 90853 GROUP PSYCHOTHERAPY: CPT

## 2024-09-18 NOTE — GROUP NOTE
Group Topic: Dialectical Behavioral Therapy - Mindfulness   Group Date: 9/18/2024  Start Time:  9:00 AM  End Time: 10:00 AM  Facilitators: DENISE Arana   Department: [facility]    Number of Participants: 4   Treatment Modality: Dialectical Behavioral Therapy  Interventions utilized were exploration, group exercise, and support  Purpose: trigger / craving management  Comment: Patient participated in a mindfulness meditation exercise and a group check-in.  The purpose and benefits of mindfulness meditation in strengthening relapse prevention skills was explained.  A mindfulness meditation exercise was conducted, and patient provided feedback in the form of personal observations from the exercise.    Name: Autumn Hyde YOB: 1980   MR: [id number]      Level of Participation: when cued  Quality of Participation: appropriate/pleasant, attentive, cooperative, and engaged  Mood/Affect: appropriate  Progress: Gaining insight or knowledge  Plan: continue with services

## 2024-09-18 NOTE — GROUP NOTE
Group Topic: Chemical Dependency - Relapse   Group Date: 9/18/2024 -[facility]  Start Time: 10:00 AM  End Time: 11:00 AM  Facilitators: CORNELL Gandhi   [facility]    Number of Participants: 4   Treatment Modality: Psychoeducation  Interventions utilized were exploration and patient education  Purpose: relapse prevention strategies and trigger / craving management  Comment:   Family Relapse Prevention  Today is a family focused treatment day. The goal of this session is to educate regarding the progression of relapse [changes in behaviors, thinking, attitudes-triggers, etc.] in both persons with the illness, as well as support persons. A power point presentation and handouts were reviewed together and provided. Brief education regarding progression of family addiction and recovery behaviors that support each individual in the recovery process. Discussion throughout and members were encouraged to apply information/insights to one's own experience and behaviors.   Identified with the material and shared an example. Brief education about how 12 step programs can be helpful in supporting relapse prevention skills. She has located several online meetings and plans to attend an online 12 step meeting this evening (no transportation).  Name: Autumn Hyde YOB: 1980   MR: [id number]      Level of Participation: moderate  Quality of Participation: attentive and engaged  Mood/Affect: depressed and flat  Progress: Gaining insight or knowledge  Plan: continue with services

## 2024-09-18 NOTE — GROUP NOTE
Group Topic: Chemical Dependency - Relapse   Group Date: 9/18/2024  Start Time: 11:00 AM  End Time: 12:00 PM  Facilitators: DENISE Arana   Department: [facility]    Number of Participants: 4   Treatment Modality: Cognitive Behavioral Therapy and Dialectical Behavioral Therapy  Interventions utilized were exploration, reality testing, and support  Purpose: feelings, communication skills, and trigger / craving management  Comment: Group therapy with members of the PHP and [facility] levels of treatment.  It was family day.  Started the group with a reading from “Body, Mind, and Spirit.”  The reading talked about patience in recovery.  Group members shared what they could relate to from today's reading.  Group members then shared at length about family related recovery issues.         Name: Autumn Hyde YOB: 1980   MR: [id number]      Level of Participation: active  Quality of Participation: appropriate/pleasant, attentive, cooperative, and engaged.      Mood/Affect: appropriate  Progress: Gaining insight or knowledge.  Patient was active and engaged in today's group therapy session.  She shared with the group at length regarding some of her personal stressors which she believe helped to cause her addiction.  The group offered her both feedback and support.  Patient reports continued sobriety from all mind/mood altering substances.    Plan: continue with services

## 2024-09-19 ENCOUNTER — APPOINTMENT (OUTPATIENT)
Dept: BEHAVIORAL HEALTH | Facility: CLINIC | Age: 44
End: 2024-09-19
Payer: COMMERCIAL

## 2024-09-20 LAB — CARBOXYTHC UR-MCNC: 247 NG/ML

## 2024-09-23 ENCOUNTER — MULTIDISCIPLINARY VISIT (OUTPATIENT)
Dept: BEHAVIORAL HEALTH | Facility: CLINIC | Age: 44
End: 2024-09-23
Payer: COMMERCIAL

## 2024-09-23 DIAGNOSIS — F12.20 CANNABIS USE DISORDER, MODERATE: Primary | ICD-10-CM

## 2024-09-23 PROCEDURE — 80307 DRUG TEST PRSMV CHEM ANLYZR: CPT

## 2024-09-23 PROCEDURE — 80349 CANNABINOIDS NATURAL: CPT

## 2024-09-23 PROCEDURE — 80346 BENZODIAZEPINES1-12: CPT

## 2024-09-23 PROCEDURE — 90853 GROUP PSYCHOTHERAPY: CPT

## 2024-09-23 PROCEDURE — 80365 DRUG SCREENING OXYCODONE: CPT

## 2024-09-23 NOTE — GROUP NOTE
Group Topic: Dialectical Behavioral Therapy - Mindfulness   Group Date: 9/23/2024  Start Time:  9:00 AM  End Time: 10:00 AM  Facilitators: DENISE Arana   Department: [facility]    Number of Participants: 5   Treatment Modality: Dialectical Behavioral Therapy  Interventions utilized were exploration, group exercise, and support  Purpose: trigger / craving management  Comment: Patient participated in a mindfulness meditation exercise and a group check-in.  The purpose and benefits of mindfulness meditation in strengthening relapse prevention skills was explained.  A mindfulness meditation exercise was conducted, and patient provided feedback in the form of personal observations from the exercise.    Name: Autumn Hyde YOB: 1980   MR: [id number]      Level of Participation: when cued  Quality of Participation: appropriate/pleasant, attentive, cooperative, and engaged  Mood/Affect: appropriate  Progress: Gaining insight or knowledge  Plan: continue with services.  Patient will continue with IOP services.

## 2024-09-23 NOTE — GROUP NOTE
Group Topic: Chemical Dependency - Relapse   Group Date: 9/23/2024-[facility]  Start Time: 10:00 AM  End Time: 11:00 AM  Facilitators: CORNELL Gandhi   [facility]    Number of Participants: 5   Treatment Modality: Psychoeducation  Interventions utilized were patient education  Purpose: trigger / craving management  Comment:   Identifying and coping with triggers  Goal of this session is to review types of triggers [cues] and identify their own triggers/associations surrounding their use. (Internal, external and sensory) A packet of information was distributed and worked through together by reading, answering questions in this topic followed by discussion. Each member was able to personally apply the content to their life.    Less defensive and increasingly able to identify triggers for use.   Name: Autumn Hyde YOB: 1980   MR: [id number]      Level of Participation: active  Quality of Participation: engaged  Mood/Affect: flat  Progress: Gaining insight or knowledge  Plan: continue with services

## 2024-09-23 NOTE — GROUP NOTE
Group Topic: Chemical Dependency - Relapse   Group Date: 9/23/2024  Start Time: 11:00 AM  End Time: 12:00 PM  [facility]   Department: [facility]    Number of Participants: 10   Treatment Modality: Cognitive Behavioral Therapy and Dialectical Behavioral Therapy  Interventions utilized were exploration, reality testing, and support  Purpose: feelings, communication skills, and trigger / craving management  Comment: Group therapy with patients from the IOP and aftercare levels of treatment.  Started the group with introductions as we have a newer IOP group member who had not met the members of the aftercare group until today.  Next, there was a reading from “Body, Mind, and Spirit.”  The reading talked about having choices in recovery. Group members shared what they could relate to from today's reading.  Group members then shared recovery updates including any recent and/or current sobriety threats.      Name: Autumn Hyde YOB: 1980   MR: [id number]      Level of Participation: active  Quality of Participation: appropriate/pleasant, attentive, cooperative, and engaged  Mood/Affect: appropriate  Progress: Gaining insight or knowledge.  Patient shared with the group that things seem to be going better relationally in her family since she started recovery.  The group was very supportive of this disclosure.  Patient reports continued abstinence from mind/mood altering substances.    Plan: continue with services.  Patient will continue with [facility] services.

## 2024-09-25 ENCOUNTER — MULTIDISCIPLINARY VISIT (OUTPATIENT)
Dept: BEHAVIORAL HEALTH | Facility: CLINIC | Age: 44
End: 2024-09-25
Payer: COMMERCIAL

## 2024-09-25 DIAGNOSIS — F12.20 CANNABIS USE DISORDER, MODERATE: ICD-10-CM

## 2024-09-25 LAB — ETHYL GLUCURONIDE UR QL SCN: NEGATIVE NG/ML

## 2024-09-25 PROCEDURE — 90853 GROUP PSYCHOTHERAPY: CPT

## 2024-09-25 NOTE — GROUP NOTE
Group Topic: Dialectical Behavioral Therapy - Mindfulness   Group Date: 9/25/2024  Start Time:  9:00 AM  End Time: 10:00 AM  Facilitators: DENISE Arana   Department: [facility]    Number of Participants: 3   Treatment Modality: Dialectical Behavioral Therapy  Interventions utilized were exploration, group exercise, and support  Purpose: feelings, insight or knowledge, and trigger / craving management  Comment: Patient participated in a mindfulness meditation exercise and a group check-in.  The purpose and benefits of mindfulness meditation in strengthening relapse prevention skills was explained.  A mindfulness meditation exercise was conducted, and patient provided feedback in the form of personal observations from the exercise.    Name: Autumn Hyde YOB: 1980   MR: [id number]      Level of Participation: when cued  Quality of Participation: appropriate/pleasant, attentive, cooperative, and engaged  Mood/Affect: appropriate  Progress: Gaining insight or knowledge  Plan: continue with services

## 2024-09-25 NOTE — GROUP NOTE
Group Topic: Chemical Dependency - Relapse   Group Date: 9/25/2024  Start Time: 11:00 AM  End Time: 12:00 PM  Facilitators: DENISE Arana   Department: [facility]    Number of Participants: 3   Treatment Modality: Cognitive Behavioral Therapy and Dialectical Behavioral Therapy  Interventions utilized were exploration, reality testing, and support  Purpose: feelings, communication skills, and trigger / craving management  Comment: Group therapy with members of the [facility] level of treatment.  Started the group with a reading from “Body, Mind, and Spirit.”  The reading talked about the importance of doing, and not just talking in recovery.  Group members shared what they could relate to from today's reading. Group members then provided recovery updates including any recent and/or current sobriety threats.    Name: Autumn Hyde YOB: 1980   MR: [id number]      Level of Participation: active  Quality of Participation: appropriate/pleasant, attentive, cooperative, and engaged  Mood/Affect: appropriate  Progress: Gaining insight or knowledge.  Patient was active and engaged in today's group therapy session.  Patient shared with the group about several recent decisions that she has made that are very positive for her in regards to moving forward in her life.  The group was very supportive.  Patient reports continued abstinence from mind/mood altering substances.    Plan: continue with services

## 2024-09-25 NOTE — GROUP NOTE
Group Topic: Social Skills   Group Date: 9/25/2024-[facility]  Start Time: 10:00 AM  End Time: 11:00 AM  Facilitators: CORNELL Gandhi   [facility]    Number of Participants: 3   Treatment Modality: Psychoeducation  Interventions utilized were group exercise and patient education  Purpose: coping skills and communication skills  Comment:   Promoting Healthy Boundaries-Family  Goal of this session is to identify areas where we may establish and examples of boundary violations in each areas.  A power point presentation was reviewed on the topic and members asked questions and shared relevant examples throughout. Also two brief videos were reviewed. One provided further explanation of the boundary areas and violations and the other provided a role play of establishing boundaries.   Good eye contact and effort made to apply content to self using shared personal examples and challenges.   Name: Autumn Hyde YOB: 1980   MR: [id number]      Level of Participation: active  Quality of Participation: engaged  Mood/Affect: appropriate  Progress: Moderate  Plan: continue with services

## 2024-09-26 ENCOUNTER — MULTIDISCIPLINARY VISIT (OUTPATIENT)
Dept: BEHAVIORAL HEALTH | Facility: CLINIC | Age: 44
End: 2024-09-26
Payer: COMMERCIAL

## 2024-09-26 DIAGNOSIS — F12.20 CANNABIS USE DISORDER, MODERATE: ICD-10-CM

## 2024-09-26 LAB
1OH-MIDAZOLAM UR CFM-MCNC: <25 NG/ML
6MAM UR CFM-MCNC: <25 NG/ML
7AMINOCLONAZEPAM UR CFM-MCNC: <25 NG/ML
A-OH ALPRAZ UR CFM-MCNC: <25 NG/ML
ALPRAZ UR CFM-MCNC: <25 NG/ML
CARBOXYTHC UR-MCNC: 70 NG/ML
CHLORDIAZEP UR CFM-MCNC: <25 NG/ML
CLONAZEPAM UR CFM-MCNC: <25 NG/ML
CODEINE UR CFM-MCNC: <50 NG/ML
DIAZEPAM UR CFM-MCNC: <25 NG/ML
HYDROCODONE CTO UR CFM-MCNC: <25 NG/ML
HYDROMORPHONE UR CFM-MCNC: <25 NG/ML
LORAZEPAM UR CFM-MCNC: <25 NG/ML
MIDAZOLAM UR CFM-MCNC: <25 NG/ML
MORPHINE UR CFM-MCNC: <50 NG/ML
NORDIAZEPAM UR CFM-MCNC: <25 NG/ML
NORHYDROCODONE UR CFM-MCNC: <25 NG/ML
NOROXYCODONE UR CFM-MCNC: <25 NG/ML
OXAZEPAM UR CFM-MCNC: <25 NG/ML
OXYCODONE UR CFM-MCNC: <25 NG/ML
OXYMORPHONE UR CFM-MCNC: <25 NG/ML
TEMAZEPAM UR CFM-MCNC: <25 NG/ML

## 2024-09-26 PROCEDURE — 90853 GROUP PSYCHOTHERAPY: CPT

## 2024-09-26 NOTE — GROUP NOTE
Group Topic: Chemical Dependency - Relapse   Group Date: 9/26/2024  Start Time: 10:00 AM  End Time: 11:00 AM  Facilitators: CORNELL Gandhi   [facility]    Number of Participants: 4   Treatment Modality: Psychoeducation  Interventions utilized were group exercise, patient education, and problem solving  Purpose: relapse prevention strategies and trigger / craving management  Comment:   Identifying and coping with triggers  Goal of this session is to identify their own triggers/associations surrounding their use and ways of coping, avoiding and interrupting triggers. Reading content, answering questions, and review of personalized responses were the topic of discussion. A packet of information was distributed and worked through together by reading, answering questions in this topic followed by discussion. Each member was able to personally apply the content to their life.    Increase awareness of a variety of triggers vs. Sleep.   Name: Autumn Hyde YOB: 1980   MR: [id number]      Level of Participation: moderate  Quality of Participation: engaged  Mood/Affect: appropriate  Progress: Gaining insight or knowledge  Plan: continue with services

## 2024-09-26 NOTE — GROUP NOTE
Group Topic: Chemical Dependency - Relapse   Group Date: 9/26/2024-[facility]  Start Time: 11:00 AM  End Time: 12:00 PM  Facilitators: CORNELL Gandhi   [facility]    Number of Participants: 4   Treatment Modality: Patient-Centered Therapy and Psychoeducation  Interventions utilized were patient education, problem solving, and support  Purpose: coping skills and relapse prevention strategies  Comment: Group Counseling   Group began with a meditative reading about change coming from within ourselves  vs. from others and pts. were encouraged to reflect and respond. Members were encouraged to provide the group with an update regarding their recovery progress, as well as discuss shared experiences and observations.    Review weekend plans and identified feeling overwhelmed. Able to identify ways to practice self care. After group reviewed her UDS. She denies use. Assisted pt. In locating various forms of 12 step meetings.   Name: Autumn Hyde YOB: 1980   MR: [id number]      Level of Participation: moderate  Quality of Participation: attentive and cooperative  Mood/Affect: flat  Progress: Gaining insight or knowledge  Plan: continue with services

## 2024-09-26 NOTE — GROUP NOTE
Group Topic: Dialectical Behavioral Therapy - Mindfulness   Group Date: 9/26/2024-[facility]  Start Time:  9:00 AM  End Time: 10:00 AM  Facilitators: CORNELL Gandhi   [facility]    Number of Participants: 4   Treatment Modality: Dialectical Behavioral Therapy  Interventions utilized were group exercise and patient education  Purpose: coping skills and feelings  Comment:   Group began with a meditative reading and pts. were encouraged to reflect and respond.  This was followed by a brief explanation of the potential benefits of mindfulness.  An experiential exercise was completed and afterwards members were asked to share a non-judgmental observation about their experience as well as a daily gratitude. Members shared an update regarding recovery progress.   Positive attitude.   Name: Autumn Hyde YOB: 1980   MR: [id number]      Level of Participation: active  Quality of Participation: engaged  Mood/Affect: appropriate  Progress: Gaining insight or knowledge  Plan: continue with services

## 2024-09-30 ENCOUNTER — MULTIDISCIPLINARY VISIT (OUTPATIENT)
Dept: BEHAVIORAL HEALTH | Facility: CLINIC | Age: 44
End: 2024-09-30
Payer: COMMERCIAL

## 2024-09-30 DIAGNOSIS — F12.20 CANNABIS USE DISORDER, MODERATE: Primary | ICD-10-CM

## 2024-09-30 PROCEDURE — 90853 GROUP PSYCHOTHERAPY: CPT

## 2024-09-30 PROCEDURE — 80307 DRUG TEST PRSMV CHEM ANLYZR: CPT

## 2024-09-30 PROCEDURE — 80365 DRUG SCREENING OXYCODONE: CPT

## 2024-09-30 PROCEDURE — 80346 BENZODIAZEPINES1-12: CPT

## 2024-09-30 PROCEDURE — 80349 CANNABINOIDS NATURAL: CPT

## 2024-09-30 NOTE — GROUP NOTE
Group Topic: Chemical Dependency - Relapse   Group Date: 9/30/2024  Start Time: 11:00 AM  End Time: 12:00 PM  Facilitators: [facility]   Department: [facility]    Number of Participants: 6   Treatment Modality: Cognitive Behavioral Therapy and Dialectical Behavioral Therapy  Interventions utilized were exploration, reality testing, and support  Purpose: feelings, communication skills, and trigger / craving management  Comment: Group therapy with members of the IOP and aftercare levels of treatment.  Started the group with a reading from “Body, Mind, and Spirit.”  The reading talked about how every person has problems.  Group members shared what they could relate to from today's reading.  Group members then provided recovery updates including any recent and/or current sobriety threats.      Name: Autumn Hyde YOB: 1980   MR: [id number]      Level of Participation: active  Quality of Participation: appropriate/pleasant, attentive, cooperative, and engaged  Mood/Affect: appropriate  Progress: Gaining insight or knowledge.  Patient was active and engaged in today's group therapy session.  Patient shared with the group a couple of her coping strategies that she utilizes when under stress and/or experiencing cravings.  This was shared in order to support another group member.  Patient reports ongoing abstinence.    Plan: continue with services.  Patient will continue with [facility] services.

## 2024-09-30 NOTE — GROUP NOTE
Group Topic: Dialectical Behavioral Therapy - Mindfulness   Group Date: 9/30/2024  Start Time:  9:00 AM  End Time: 10:00 AM  Facilitators: DENISE Arana   Department: [facility]    Number of Participants: 5   Treatment Modality: Dialectical Behavioral Therapy  Interventions utilized were exploration, group exercise, and support  Purpose: feelings, communication skills, insight or knowledge, and trigger / craving management  Comment: Patient participated in a mindfulness meditation exercise and a group check-in.  The purpose and benefits of mindfulness meditation in strengthening relapse prevention skills was explained.  A mindfulness meditation exercise was conducted, and patient provided feedback in the form of personal observations from the exercise.    Name: Autumn Hyde YOB: 1980   MR: [id number]      Level of Participation: when cued  Quality of Participation: appropriate/pleasant, attentive, cooperative, and engaged  Mood/Affect: appropriate  Progress: Gaining insight or knowledge  Plan: continue with services.  Patient will continue with IOP services.

## 2024-09-30 NOTE — GROUP NOTE
Group Topic: Feeling Awareness/Expression   Group Date: 9/30/2024-[facility]  Start Time: 10:00 AM  End Time: 11:00 AM  Facilitators: CORNELL Gandhi   [facility]    Number of Participants: 5   Treatment Modality: Psychoeducation  Interventions utilized were patient education and problem solving  Purpose: coping skills, communication skills, and self-care  Comment:   Promoting Healthy Boundaries-Family  Goal of this session is to identify types of boundaries, as well as differences between enmeshed and rigid. Ways to establish boundaries and importance of self care through establishing boundaries with oneself were reviewed and examples were provided. A power point presentation was reviewed on the topic and members asked questions and shared relevant examples throughout. Brief video was viewed on the topic.   Shared example of home dynamics and wanting to change this.   Name: Autumn Hyde YOB: 1980   MR: [id number]      Level of Participation: active  Quality of Participation: engaged  Mood/Affect: tearful  Progress: Moderate  Plan: continue with services

## 2024-10-02 ENCOUNTER — MULTIDISCIPLINARY VISIT (OUTPATIENT)
Dept: BEHAVIORAL HEALTH | Facility: CLINIC | Age: 44
End: 2024-10-02
Payer: COMMERCIAL

## 2024-10-02 DIAGNOSIS — F12.20 CANNABIS USE DISORDER, MODERATE, DEPENDENCE (MULTI): ICD-10-CM

## 2024-10-02 LAB — ETHYL GLUCURONIDE UR QL SCN: NEGATIVE NG/ML

## 2024-10-02 PROCEDURE — 90853 GROUP PSYCHOTHERAPY: CPT

## 2024-10-02 NOTE — GROUP NOTE
Group Topic: Dialectical Behavioral Therapy - Mindfulness   Group Date: 10/2/2024  Start Time:  9:00 AM  End Time: 10:00 AM  Facilitators: DENISE Arana   Department: [facility]    Number of Participants: 5   Treatment Modality: Dialectical Behavioral Therapy  Interventions utilized were exploration, group exercise, and support  Purpose: feelings, communication skills, insight or knowledge, and trigger / craving management  Comment: Patient participated in a mindfulness meditation exercise and a group check-in.  The purpose and benefits of mindfulness meditation in strengthening relapse prevention skills was explained.  A mindfulness meditation exercise was conducted, and patient provided feedback in the form of personal observations from the exercise.    Name: Autumn Hyde YOB: 1980   MR: [id number]      Level of Participation: when cued  Quality of Participation: appropriate/pleasant, attentive, cooperative, and engaged  Mood/Affect: appropriate  Progress: Gaining insight or knowledge  Plan: continue with services

## 2024-10-02 NOTE — GROUP NOTE
Group Topic: Stress Reduction/Relaxation   Group Date: 10/2/2024 -[facility]  Start Time: 10:00 AM  End Time: 11:00 AM  Facilitators: CORNELL Gandhi   [facility]    Number of Participants: 5   Treatment Modality: Psychoeducation  Interventions utilized were patient education  Purpose: coping skills and self-care  Comment:   Goal of this session is to educate about the importance of stress management in managing chronic illness-addiction. Handouts were distributed which contained various self evaluations and information about the following topics: symptoms in response to stress, emotional management, life balance and basic needs (self care). A sheet titled 'Gratitude Exercises' was also discussed and encouraged as one tool to manage stress.   Actively participating in completing exercise and sharing personal insights.   Name: Autumn Hyde YOB: 1980   MR: [id number]      Level of Participation: active  Quality of Participation: engaged  Mood/Affect: appropriate  Progress: Gaining insight or knowledge  Plan: continue with services

## 2024-10-02 NOTE — GROUP NOTE
Group Topic: Chemical Dependency - Relapse   Group Date: 10/2/2024  Start Time: 11:00 AM  End Time: 12:00 PM  Facilitators: DENISE Arana; CORNELL Gandhi   [facility]    Number of Participants: 5   Treatment Modality: Cognitive Behavioral Therapy and Dialectical Behavioral Therapy  Interventions utilized were exploration, reality testing, and support  Purpose: feelings, communication skills, and trigger / craving management  Comment: Group therapy with members of the [facility] level of treatment.  Started the group with introductions as we had a new group member today.  There was then a reading from “Body, Mind, and Spirit.”  The reading talked about the destructive power of criticism in recovery.  Group members shared what they could relate to from today's reading.  This was followed by group members providing recovery updates including any recent and/or current sobriety threats.      Name: Autumn Hyde YOB: 1980   MR: [id number]      Level of Participation: active  Quality of Participation: appropriate/pleasant, attentive, cooperative, and engaged  Mood/Affect: appropriate  Progress: Gaining insight or knowledge.  Patient was active and engaged in today's group therapy session.  Patient talked about some of the positive changes she is seeing in her life and family since starting recovery.  The group was very supportive of this disclosure.  Patient reports ongoing abstinence from mind/mood altering substances.    Plan: continue with services

## 2024-10-03 ENCOUNTER — MULTIDISCIPLINARY VISIT (OUTPATIENT)
Dept: BEHAVIORAL HEALTH | Facility: CLINIC | Age: 44
End: 2024-10-03
Payer: COMMERCIAL

## 2024-10-03 DIAGNOSIS — F12.20 CANNABIS USE DISORDER, MODERATE, DEPENDENCE (MULTI): ICD-10-CM

## 2024-10-03 PROCEDURE — 90853 GROUP PSYCHOTHERAPY: CPT

## 2024-10-03 NOTE — GROUP NOTE
Group Topic: Dialectical Behavioral Therapy - Mindfulness   Group Date: 10/3/2024  Start Time:  9:00 AM  End Time: 10:00 AM  Facilitators: CORNELL Gandhi   [facility]    Number of Participants: 6   Treatment Modality: Dialectical Behavioral Therapy  Interventions utilized were problem solving  Purpose: coping skills  Comment:   Group began with a meditative reading and pts. were encouraged to reflect and respond.  This was followed by a brief explanation of the potential benefits of mindfulness.  An experiential exercise was completed and afterwards members were asked to share a non-judgmental observation about their experience as well as a daily gratitude. Members shared an update regarding recovery progress.     Name: Autumn Hyde YOB: 1980   MR: [id number]      Level of Participation: active  Quality of Participation: engaged  Mood/Affect: appropriate  Progress: Gaining insight or knowledge  Plan: continue with services

## 2024-10-04 LAB
1OH-MIDAZOLAM UR CFM-MCNC: <25 NG/ML
6MAM UR CFM-MCNC: <25 NG/ML
7AMINOCLONAZEPAM UR CFM-MCNC: <25 NG/ML
A-OH ALPRAZ UR CFM-MCNC: <25 NG/ML
ALPRAZ UR CFM-MCNC: <25 NG/ML
CARBOXYTHC UR-MCNC: 63 NG/ML
CHLORDIAZEP UR CFM-MCNC: <25 NG/ML
CLONAZEPAM UR CFM-MCNC: <25 NG/ML
CODEINE UR CFM-MCNC: <50 NG/ML
DIAZEPAM UR CFM-MCNC: <25 NG/ML
HYDROCODONE CTO UR CFM-MCNC: <25 NG/ML
HYDROMORPHONE UR CFM-MCNC: <25 NG/ML
LORAZEPAM UR CFM-MCNC: <25 NG/ML
MIDAZOLAM UR CFM-MCNC: <25 NG/ML
MORPHINE UR CFM-MCNC: <50 NG/ML
NORDIAZEPAM UR CFM-MCNC: <25 NG/ML
NORHYDROCODONE UR CFM-MCNC: <25 NG/ML
NOROXYCODONE UR CFM-MCNC: <25 NG/ML
OXAZEPAM UR CFM-MCNC: <25 NG/ML
OXYCODONE UR CFM-MCNC: <25 NG/ML
OXYMORPHONE UR CFM-MCNC: <25 NG/ML
TEMAZEPAM UR CFM-MCNC: <25 NG/ML

## 2024-10-04 NOTE — GROUP NOTE
Group Topic: Chemical Dependency - Relapse   Group Date: 10/3/2024-[facility]  Start Time: 11:00 AM  End Time: 12:00 PM  Facilitators: CORNELL Gandhi   [facility]    Number of Participants: 6   Treatment Modality: Cognitive Behavioral Therapy, Psychoeducation, and Skills Training  Interventions utilized were other relapse prevention planning , patient education, and support  Purpose: coping skills and relapse prevention strategies  Comment:   Group began with a meditative reading about being cautious and asking for help, and pts. were encouraged to reflect and respond. Members were encouraged to provide the group with an update regarding their recovery progress, as well as discuss shared experiences and observations.    Notes improved relationships.   Name: Autumn Hyde YOB: 1980   MR: [id number]      Level of Participation: when cued  Quality of Participation: attentive and cooperative  Mood/Affect: appropriate  Progress: Moderate  Plan: follow-up needed-encouraging meeting attendance; assign First step and referral for counseling

## 2024-10-04 NOTE — GROUP NOTE
Group Topic: Chemical Dependency - AA   Group Date: 10/3/2024-IOP  Start Time: 10:00 AM  End Time: 11:00 AM  Facilitators: CORNELL Gandhi   [facility]    Number of Participants: 6   Treatment Modality: Psychoeducation  Interventions utilized were patient education and support  Purpose: coping skills and other: motivation enhancement  Comment:   Goal of this session is to increase awareness of 12 step programs and facilitate a 'warm handoff' in hopes to increase connection of [ts to AA/NA. Educate about and normalize recovery process. A volunteer shared about their personal recovery and experiences within a 12 step program. Components of this were reviewed. Members were given opportunity to ask questions.   Good eye contact.   Name: Autumn Hyde YOB: 1980   MR: [id number]      Level of Participation: moderate  Quality of Participation: attentive  Mood/Affect: appropriate  Progress: Gaining insight or knowledge  Plan: continue with services

## 2024-10-07 ENCOUNTER — MULTIDISCIPLINARY VISIT (OUTPATIENT)
Dept: BEHAVIORAL HEALTH | Facility: CLINIC | Age: 44
End: 2024-10-07
Payer: COMMERCIAL

## 2024-10-07 DIAGNOSIS — F12.20 CANNABIS USE DISORDER, MODERATE, DEPENDENCE (MULTI): Primary | ICD-10-CM

## 2024-10-07 PROCEDURE — 90853 GROUP PSYCHOTHERAPY: CPT

## 2024-10-07 PROCEDURE — 80307 DRUG TEST PRSMV CHEM ANLYZR: CPT

## 2024-10-07 PROCEDURE — 80346 BENZODIAZEPINES1-12: CPT

## 2024-10-07 PROCEDURE — 80349 CANNABINOIDS NATURAL: CPT

## 2024-10-07 NOTE — GROUP NOTE
Group Topic: Dialectical Behavioral Therapy - Mindfulness   Group Date: 10/7/2024  Start Time:  9:00 AM  End Time: 10:00 AM  Facilitators: DENISE Arana   Department: [facility]    Number of Participants: 5   Treatment Modality: Dialectical Behavioral Therapy  Interventions utilized were exploration, group exercise, and support  Purpose: feelings, communication skills, insight or knowledge, and trigger / craving management  Comment: Patient participated in a mindfulness meditation exercise and a group check-in.  The purpose and benefits of mindfulness meditation in strengthening relapse prevention skills was explained.  A mindfulness meditation exercise was conducted, and patient provided feedback in the form of personal observations from the exercise.    Name: Autumn Hyde YOB: 1980   MR: [id number]      Level of Participation: when cued  Quality of Participation: appropriate/pleasant, attentive, cooperative, and engaged  Mood/Affect: appropriate  Progress: Gaining insight or knowledge  Plan: continue with services.  Patient will continue with IOP services.

## 2024-10-07 NOTE — GROUP NOTE
Group Topic: Chemical Dependency - Relapse   Group Date: 10/7/2024-[facility]  Start Time: 11:00 AM  End Time: 12:00 PM  Facilitators: CORNELL Gandhi; DENISE Arana   [facility]    Number of Participants: 6   Treatment Modality: Patient-Centered Therapy and Psychoeducation  Interventions utilized were clarification, exploration, patient education, and support  Purpose: coping skills and insight or knowledge  Comment: Group Counseling   Group began with a meditative reading and pts. were encouraged to reflect and respond. Members were encouraged to provide the group with an update regarding their recovery progress, as well as discuss shared experiences and observations.    Responded to reading by sharing reaction to feeling 'unheard'. Briefly talked about ways to maintain boundaries, accept powerlessness over others and encourage behavioral action towards self care. Still hasn't attender any 12 step meetings. Shared positive changes in her attitude towards treatment as a way to relate to peer. Given First step assignment.   Name: Autumn Hyde YOB: 1980   MR: [id number]      Level of Participation: moderate  Quality of Participation: engaged  Mood/Affect: appropriate  Progress: Moderate  Plan: patient will be encouraged to attend 12 step meetings.

## 2024-10-07 NOTE — GROUP NOTE
Group Topic: Chemical Dependency - AA   Group Date: 10/7/2024  Start Time: 10:00 AM  End Time: 11:00 AM  Facilitators: CORNELL Gandhi   [facility]    Number of Participants: 6   Treatment Modality: Patient-Centered Therapy and Psychoeducation  Interventions utilized were group exercise  Purpose: insight or knowledge  Comment: First step-signs and symptoms exercise    Goal of this session is to examine signs and symptoms of addiction within the frame work of Step 1 of a 12 step program [powerlessness, loss of control, etc.].  An exercise that is a review of Step 1 was presented by a member who is completing the IOP portion of treatment.  Members are asked to respond to a series of questions where they share their own signs and symptoms of addiction including, preoccupation, protecting supply, powerlessness, use despite consequences, unmanageability, loss of control, etc. Other group members are asked to participate by reading questions, actively listening and identification with the speaker's content.  Each member is asked to give the presenter feedback in the form of identification, shared treatment experience, observation, etc. and regarding their ongoing recovery plan. Today a coin was presented to recognize the completion of the Intensive Outpatient Program for the member.   Participated and self related to content, providing appropriate feedback.   Name: Autumn Hyde YOB: 1980   MR: [id number]      Level of Participation: active  Quality of Participation: engaged  Mood/Affect: appropriate  Progress: Gaining insight or knowledge  Plan: continue with services

## 2024-10-09 ENCOUNTER — MULTIDISCIPLINARY VISIT (OUTPATIENT)
Dept: BEHAVIORAL HEALTH | Facility: CLINIC | Age: 44
End: 2024-10-09
Payer: COMMERCIAL

## 2024-10-09 DIAGNOSIS — F12.20 CANNABIS USE DISORDER, MODERATE, DEPENDENCE (MULTI): ICD-10-CM

## 2024-10-09 LAB — ETHYL GLUCURONIDE UR QL SCN: NEGATIVE NG/ML

## 2024-10-09 PROCEDURE — 90853 GROUP PSYCHOTHERAPY: CPT

## 2024-10-10 ENCOUNTER — MULTIDISCIPLINARY VISIT (OUTPATIENT)
Dept: BEHAVIORAL HEALTH | Facility: CLINIC | Age: 44
End: 2024-10-10
Payer: COMMERCIAL

## 2024-10-10 DIAGNOSIS — F12.20 CANNABIS USE DISORDER, MODERATE, DEPENDENCE (MULTI): Primary | ICD-10-CM

## 2024-10-10 PROCEDURE — 90853 GROUP PSYCHOTHERAPY: CPT

## 2024-10-10 PROCEDURE — 80307 DRUG TEST PRSMV CHEM ANLYZR: CPT

## 2024-10-10 PROCEDURE — 80349 CANNABINOIDS NATURAL: CPT

## 2024-10-10 NOTE — GROUP NOTE
Group Topic: Dialectical Behavioral Therapy - Mindfulness   Group Date: 10/9/2024-[facility]  Start Time:  9:00 AM  End Time: 10:00 AM  Facilitators: CORNELL Gandhi   [facility]    Number of Participants: 5   Treatment Modality: Dialectical Behavioral Therapy  Interventions utilized were group exercise  Purpose: coping skills  Comment: Mindfulness and check in  Group began with a meditative reading and pts. were encouraged to reflect and respond.  This was followed by a brief explanation of the potential benefits of mindfulness.  An experiential exercise was completed and afterwards members were asked to share a non-judgmental observation about their experience as well as a daily gratitude. Members shared an update regarding recovery progress.     Name: Autumn Hyde YOB: 1980   MR: [id number]      Level of Participation: moderate  Quality of Participation: attentive and engaged  Mood/Affect: appropriate  Progress: Gaining insight or knowledge  Plan: continue with services

## 2024-10-10 NOTE — GROUP NOTE
Group Topic: Illness Education   Group Date: 10/10/2024-[facility]  Start Time: 10:00 AM  End Time: 11:00 AM  Facilitators: CORNELL Gandhi   [facility]    Number of Participants: 4   Treatment Modality: Psychoeducation  Interventions utilized were patient education  Purpose: relapse prevention strategies and trigger / craving management  Comment: Disease Concept/Neurobiology Pt. 1  Participants viewed video presented by Dr. April Cueto ['[place]'] focused on neurobiology of addiction [role of genetics, dopamine and glutamate, etc.]. Purpose of video/discussion is to increase understanding of disease concept and application of this information to addiction/recovery management. Follow up with review of content and discussion of personal application.     Name: Autumn Hyde YOB: 1980   MR: [id number]      Level of Participation: minimal  Quality of Participation: attentive  Mood/Affect: appropriate  Progress: Moderate  Plan: continue with services

## 2024-10-10 NOTE — GROUP NOTE
Group Topic: Dialectical Behavioral Therapy - Mindfulness   Group Date: 10/10/2024IOP  Start Time:  9:00 AM  End Time: 10:00 AM  Facilitators: CORNELL Gandhi   [facility]    Number of Participants: 4   Treatment Modality: Dialectical Behavioral Therapy  Interventions utilized were group exercise and patient education  Purpose: coping skills and relapse prevention strategies  Comment:     Name: Autumn Hyde YOB: 1980   MR: [id number]      Level of Participation: active  Quality of Participation: engaged  Mood/Affect: appropriate  Progress: Gaining insight or knowledge  Plan: continue with services

## 2024-10-10 NOTE — GROUP NOTE
Group Topic: Chemical Dependency - Relapse   Group Date: 10/9/2024-IOP  Start Time: 11:00 AM  End Time: 12:00 PM  Facilitators: CORNELL Gandhi   [facility]    Number of Participants: 5   Treatment Modality: Cognitive Behavioral Therapy and Psychoeducation  Interventions utilized were exploration, patient education, and support  Purpose: coping skills and relapse prevention strategies  Comment: Group Counseling    Group began with a meditative reading and pts. were encouraged to reflect and respond. Members were encouraged to provide the group with an update regarding their recovery progress, as well as discuss shared experiences and observations. An IOP completion ceremony was completed with peers sharing feedback regarding the pt.'s discharge plan, etc.   Brief intervention regarding pt.'s sense of powerlessness re:family interactions. Group provided support and staff encouraged behavioral changes and acceptance.   Name: Autumn Hyde YOB: 1980   MR: [id number]      Level of Participation: active  Quality of Participation: engaged  Mood/Affect: tearful  Progress: Gaining insight or knowledge  Plan: continue with services

## 2024-10-10 NOTE — GROUP NOTE
Group Topic: Chemical Dependency - Relapse   Group Date: 10/10/2024-[facility]  Start Time: 11:00 AM  End Time: 12:00 PM  Facilitators: CORNELL Gandhi   [facility]    Number of Participants: 4   Treatment Modality: Interpersonal Therapy, Patient-Centered Therapy, and Psychoeducation  Interventions utilized were exploration, patient education, and support  Purpose: coping skills and relapse prevention strategies  Comment: group counseling  Group began with a meditative reading and pts. were encouraged to reflect and respond. Members were encouraged to provide the group with an update regarding their recovery progress, as well as discuss shared experiences and observations.  Weekend relapse prevention planning occurred.   Beginning to practice acceptance with respect to family dynamics. Planning to return to work as soon as UDS is negative. Attended AA meeting last night. Denial is decreasing.   Name: Autumn Hyde YOB: 1980   MR: [id number]      Level of Participation: active  Quality of Participation: engaged  Mood/Affect: appropriate and tearful  Progress: Gaining insight or knowledge  Plan: continue with services

## 2024-10-10 NOTE — GROUP NOTE
Group Topic: Dialectical Behavioral Therapy - Emotional Regulation   Group Date: 10/9/2024-[facility]  Start Time: 10:00 AM  End Time: 11:00 AM  Facilitators: CORNELL Gandhi   [facility]    Number of Participants: 5   Treatment Modality: Psychoeducation  Interventions utilized were patient education  Purpose: coping skills and feelings  Comment: DBT-emotional management    Goal of this session is to review concept of emotional triggers, and the importance of emotional management in recovery. Brief videos were viewed on this topic, specifically introducing the skills of opposite action. The relationship- between emotions and thinking were discussed.     Name: Autumn Hyde YOB: 1980   MR: [id number]      Level of Participation: moderate  Quality of Participation: attentive and engaged  Mood/Affect: irritable  Progress: Moderate  Plan: continue with services

## 2024-10-11 LAB
1OH-MIDAZOLAM UR CFM-MCNC: <25 NG/ML
6MAM UR CFM-MCNC: <25 NG/ML
7AMINOCLONAZEPAM UR CFM-MCNC: <25 NG/ML
A-OH ALPRAZ UR CFM-MCNC: <25 NG/ML
ALPRAZ UR CFM-MCNC: <25 NG/ML
CHLORDIAZEP UR CFM-MCNC: <25 NG/ML
CLONAZEPAM UR CFM-MCNC: <25 NG/ML
CODEINE UR CFM-MCNC: NORMAL NG/ML
DIAZEPAM UR CFM-MCNC: <25 NG/ML
HYDROCODONE CTO UR CFM-MCNC: NORMAL NG/ML
HYDROMORPHONE UR CFM-MCNC: NORMAL UG/ML
LORAZEPAM UR CFM-MCNC: <25 NG/ML
MIDAZOLAM UR CFM-MCNC: <25 NG/ML
MORPHINE UR CFM-MCNC: <50 NG/ML
NORDIAZEPAM UR CFM-MCNC: <25 NG/ML
NORHYDROCODONE UR CFM-MCNC: NORMAL NG/ML
NOROXYCODONE UR CFM-MCNC: <25 NG/ML
OXAZEPAM UR CFM-MCNC: <25 NG/ML
OXYCODONE UR CFM-MCNC: <25 NG/ML
OXYMORPHONE UR CFM-MCNC: <25 NG/ML
TEMAZEPAM UR CFM-MCNC: <25 NG/ML

## 2024-10-12 LAB
CARBOXYTHC UR-MCNC: 60 NG/ML
ETHYL GLUCURONIDE UR QL SCN: NEGATIVE NG/ML

## 2024-10-14 ENCOUNTER — MULTIDISCIPLINARY VISIT (OUTPATIENT)
Dept: BEHAVIORAL HEALTH | Facility: CLINIC | Age: 44
End: 2024-10-14
Payer: COMMERCIAL

## 2024-10-14 DIAGNOSIS — F12.20 CANNABIS USE DISORDER, MODERATE, DEPENDENCE (MULTI): Primary | ICD-10-CM

## 2024-10-14 LAB — CARBOXYTHC UR-MCNC: 43 NG/ML

## 2024-10-14 PROCEDURE — 80307 DRUG TEST PRSMV CHEM ANLYZR: CPT

## 2024-10-14 PROCEDURE — 80346 BENZODIAZEPINES1-12: CPT

## 2024-10-14 PROCEDURE — 90853 GROUP PSYCHOTHERAPY: CPT

## 2024-10-14 PROCEDURE — 80361 OPIATES 1 OR MORE: CPT

## 2024-10-14 NOTE — GROUP NOTE
Group Topic: Chemical Dependency - Primary Disease   Group Date: 10/14/2024  Start Time: 10:00 AM  End Time: 11:00 AM  Facilitators: DENISE Arana   Department: [facility]    Number of Participants: 5   Treatment Modality: Psychoeducation  Interventions utilized were patient education  Purpose: insight or knowledge  Comment: Participants viewed part 2 of a video presented by Dr. April Cueto ['[place]'] focused on neurobiology of addiction [role of genetics, dopamine and glutamate, etc.]. Purpose of video/discussion is to increase understanding of disease concept and application of this information to addiction/recovery management. Followed up with review of content and discussion of personal application.  Patient was attentive and engaged.      Name: Autumn Hyde YOB: 1980   MR: [id number]      Level of Participation: when cued  Quality of Participation: appropriate/pleasant, attentive, cooperative, and engaged  Mood/Affect: appropriate  Progress: Gaining insight or knowledge  Plan: continue with services

## 2024-10-14 NOTE — GROUP NOTE
Group Topic: Dialectical Behavioral Therapy - Mindfulness   Group Date: 10/14/2024  Start Time:  9:00 AM  End Time: 10:00 AM  Facilitators: DENISE Arana   Department: [facility]    Number of Participants: 4   Treatment Modality: Dialectical Behavioral Therapy  Interventions utilized were assignment, exploration, group exercise, and support  Purpose: feelings, communication skills, insight or knowledge, and trigger / craving management  Comment: Patient participated in a mindfulness meditation exercise and a group check-in.  The purpose and benefits of mindfulness meditation in strengthening relapse prevention skills was explained.  A mindfulness meditation exercise was conducted, and patient provided feedback in the form of personal observations from the exercise.     Name: Autumn Hyde YOB: 1980   MR: [id number]      Level of Participation: when cued  Quality of Participation: appropriate/pleasant, attentive, cooperative, and engaged  Mood/Affect: appropriate  Progress: Gaining insight or knowledge  Plan: continue with services

## 2024-10-14 NOTE — GROUP NOTE
Group Topic: Chemical Dependency - Relapse   Group Date: 10/14/2024  Start Time: 11:00 AM  End Time: 12:00 PM  [facility]   Department: [facility]    Number of Participants: 7   Treatment Modality: Cognitive Behavioral Therapy and Dialectical Behavioral Therapy  Interventions utilized were exploration, reality testing, and support  Purpose: coping skills, feelings, communication skills, and trigger / craving management  Comment: Group therapy with members of the [facility] and aftercare levels of treatment.  Started the group with introductions as all group members had not met one another.  Next, there was a reading from “Body, Mind, and Spirit” which talked about staying positive in life and recovery.  Group members shared what they could relate to from today's reading.  Group members then provided recovery updates including any recent and/or current sobriety threats.      Name: Autumn Hyde YOB: 1980   MR: [id number]      Level of Participation: active  Quality of Participation: appropriate/pleasant, attentive, cooperative, and engaged  Mood/Affect: appropriate  Progress: Gaining insight or knowledge.  Patient was active and engaged in today's group therapy session.  She shared about what life has been like since starting recovery and ways that her life has improved.  Patient starts school this evening.  The group was very supportive of her disclosures.  Patient reports ongoing abstinence.    Plan: continue with services.  Patient will continue with [facility] services.

## 2024-10-15 LAB
1OH-MIDAZOLAM UR CFM-MCNC: <25 NG/ML
6MAM UR CFM-MCNC: <25 NG/ML
7AMINOCLONAZEPAM UR CFM-MCNC: <25 NG/ML
A-OH ALPRAZ UR CFM-MCNC: <25 NG/ML
ALPRAZ UR CFM-MCNC: <25 NG/ML
AMPHETAMINES UR QL SCN: NORMAL
BARBITURATES UR QL SCN: NORMAL
BENZODIAZ UR QL SCN: NORMAL
BZE UR QL SCN: NORMAL
CANNABINOIDS UR QL SCN: NORMAL
CHLORDIAZEP UR CFM-MCNC: <25 NG/ML
CLONAZEPAM UR CFM-MCNC: <25 NG/ML
CODEINE UR CFM-MCNC: <50 NG/ML
DIAZEPAM UR CFM-MCNC: <25 NG/ML
FENTANYL+NORFENTANYL UR QL SCN: NORMAL
HYDROCODONE CTO UR CFM-MCNC: <25 NG/ML
HYDROMORPHONE UR CFM-MCNC: <25 NG/ML
LORAZEPAM UR CFM-MCNC: <25 NG/ML
METHADONE UR QL SCN: NORMAL
MIDAZOLAM UR CFM-MCNC: <25 NG/ML
MORPHINE UR CFM-MCNC: <50 NG/ML
NORDIAZEPAM UR CFM-MCNC: <25 NG/ML
NORHYDROCODONE UR CFM-MCNC: 39 NG/ML
NOROXYCODONE UR CFM-MCNC: <25 NG/ML
OPIATES UR QL SCN: NORMAL
OXAZEPAM UR CFM-MCNC: <25 NG/ML
OXYCODONE UR CFM-MCNC: <25 NG/ML
OXYCODONE+OXYMORPHONE UR QL SCN: NORMAL
OXYMORPHONE UR CFM-MCNC: <25 NG/ML
PCP UR QL SCN: NORMAL
TEMAZEPAM UR CFM-MCNC: <25 NG/ML

## 2024-10-16 ENCOUNTER — MULTIDISCIPLINARY VISIT (OUTPATIENT)
Dept: BEHAVIORAL HEALTH | Facility: CLINIC | Age: 44
End: 2024-10-16
Payer: COMMERCIAL

## 2024-10-16 DIAGNOSIS — F12.20 CANNABIS USE DISORDER, MODERATE, DEPENDENCE (MULTI): Primary | ICD-10-CM

## 2024-10-16 LAB
1OH-MIDAZOLAM UR CFM-MCNC: <25 NG/ML
6MAM UR CFM-MCNC: <25 NG/ML
7AMINOCLONAZEPAM UR CFM-MCNC: <25 NG/ML
A-OH ALPRAZ UR CFM-MCNC: <25 NG/ML
ALPRAZ UR CFM-MCNC: <25 NG/ML
CHLORDIAZEP UR CFM-MCNC: <25 NG/ML
CLONAZEPAM UR CFM-MCNC: <25 NG/ML
CODEINE UR CFM-MCNC: <50 NG/ML
DIAZEPAM UR CFM-MCNC: <25 NG/ML
ETHYL GLUCURONIDE UR QL SCN: NEGATIVE NG/ML
HYDROCODONE CTO UR CFM-MCNC: <25 NG/ML
HYDROMORPHONE UR CFM-MCNC: <25 NG/ML
LORAZEPAM UR CFM-MCNC: <25 NG/ML
MIDAZOLAM UR CFM-MCNC: <25 NG/ML
MORPHINE UR CFM-MCNC: <50 NG/ML
NORDIAZEPAM UR CFM-MCNC: <25 NG/ML
NORHYDROCODONE UR CFM-MCNC: <25 NG/ML
NOROXYCODONE UR CFM-MCNC: <25 NG/ML
OXAZEPAM UR CFM-MCNC: <25 NG/ML
OXYCODONE UR CFM-MCNC: <25 NG/ML
OXYMORPHONE UR CFM-MCNC: <25 NG/ML
TEMAZEPAM UR CFM-MCNC: <25 NG/ML

## 2024-10-16 PROCEDURE — 90853 GROUP PSYCHOTHERAPY: CPT

## 2024-10-16 NOTE — GROUP NOTE
Group Topic: Personal Responsibility   Group Date: 10/16/2024  Start Time: 10:00 AM  End Time: 11:00 AM  Facilitators: DENISE Gray   Department: [facility]    Number of Participants: 3   Group Focus: chemical dependency education  Treatment Modality: Psychoeducation  Interventions utilized were group exercise  Purpose: maladaptive thinking    Name: Autumn BROUSSARD Barrett YOB: 1980   MR: [id number]      Facilitator: Licensed Professional Counselor  Level of Participation: moderate  Quality of Participation: appropriate/pleasant  Interactions with others: appropriate  Mood/Affect: appropriate  Triggers (if applicable): NA  Cognition: coherent/clear  Progress: Moderate  Comments: Autumn was interested and engaged in the session.   Plan: continue with services

## 2024-10-16 NOTE — GROUP NOTE
Group Topic: Dialectical Behavioral Therapy - Mindfulness   Group Date: 10/16/2024  Start Time:  9:00 AM  End Time: 10:00 AM  Facilitators: DENISE Arana   Department: [facility]    Number of Participants: 3   Treatment Modality: Dialectical Behavioral Therapy  Interventions utilized were exploration, group exercise, and support  Purpose: coping skills, feelings, insight or knowledge, and trigger / craving management  Comment: Patient participated in a mindfulness meditation exercise and a group check-in.  The purpose and benefits of mindfulness meditation in strengthening relapse prevention skills was explained.  A mindfulness meditation exercise was conducted, and patient provided feedback in the form of personal observations from the exercise.     Name: Autumn Hyde YOB: 1980   MR: [id number]      Level of Participation: when cued  Quality of Participation: appropriate/pleasant, attentive, cooperative, and engaged  Mood/Affect: appropriate  Progress: Gaining insight or knowledge  Plan: continue with services.  Patient will continue with IOP services.

## 2024-10-16 NOTE — GROUP NOTE
Group Topic: Chemical Dependency - Relapse   Group Date: 10/16/2024  Start Time: 11:00 AM  End Time: 12:00 PM  Facilitators: Alexei Wood [facility]   Department: [facility]    Number of Participants: 3   Treatment Modality: Cognitive Behavioral Therapy and Dialectical Behavioral Therapy  Interventions utilized were exploration, reality testing, and support  Purpose: feelings, communication skills, and trigger / craving management  Comment: Group therapy with members of the [facility] level of treatment.  Started the group with a reading from “Body, Mind, and Spirit.”  The reading talked about recovery being an ongoing process.  Group members all shared what they could relate to from today's reading.  Group members then shared recovery updates, including family recovery updates and any current sobriety threats.      Name: Autumn Hyde YOB: 1980   MR: [id number]      Level of Participation: active  Quality of Participation: appropriate/pleasant, attentive, cooperative, and engaged  Mood/Affect: appropriate  Progress: Gaining insight or knowledge.  Patient was active and engaged in today's group therapy session.  She shared with the group about some personal situations that have been hurtful and hard to let go of.  Patient also shared how she is successfully managing this issue.  The group was very supportive and provided her with both support and feedback.  Patient reports ongoing abstinence.    Plan: continue with services.  Patient will continue with [facility] services.

## 2024-10-17 ENCOUNTER — MULTIDISCIPLINARY VISIT (OUTPATIENT)
Dept: BEHAVIORAL HEALTH | Facility: CLINIC | Age: 44
End: 2024-10-17
Payer: COMMERCIAL

## 2024-10-17 DIAGNOSIS — F12.20 CANNABIS USE DISORDER, MODERATE: ICD-10-CM

## 2024-10-17 PROCEDURE — 90853 GROUP PSYCHOTHERAPY: CPT

## 2024-10-17 NOTE — GROUP NOTE
Group Topic: Dialectical Behavioral Therapy - Mindfulness   Group Date: 10/17/2024-[facility]  Start Time:  9:00 AM  End Time: 10:00 AM  Facilitators: CORNELL Gandhi   [facility]    Number of Participants: 4   Treatment Modality: Dialectical Behavioral Therapy  Interventions utilized were exploration, group exercise, and patient education  Purpose: coping skills, self-care, and relapse prevention strategies  Comment: Mindfulness and Recovery updates    Group began with a meditative reading and pts. were encouraged to reflect and respond.  This was followed by a brief explanation of the potential benefits of mindfulness.  An experiential exercise on the skills of 'noting' and 'four square breathing', were completed. Afterwards members were asked to share a non-judgmental observation about their experience, as well as a daily gratitude. Members shared an update regarding recovery progress.   Discussed her return to work process.   Name: Autumn Hyde YOB: 1980   MR: [id number]      Level of Participation: active  Quality of Participation: attentive, cooperative, and engaged  Mood/Affect: appropriate  Progress: Moderate  Plan: continue with services

## 2024-10-17 NOTE — GROUP NOTE
Group Topic: Chemical Dependency - Relapse   Group Date: 10/17/2024-IOP  Start Time: 10:00 AM  End Time: 11:00 AM  Facilitators: CORNELL Gandhi   [facility]    Number of Participants: 3   Treatment Modality: Psychoeducation  Interventions utilized were patient education  Purpose: relapse prevention strategies  Comment:   Relapse Prevention Basics pt. 1  Goal of this session is to introduce the topic of relapse as a process. Packet was distributed and reviewed as a group. Discussion throughout on baldwin concepts including: symptoms of addiction, how it effects thinking, and the importance of identifying triggers and lifestyle changes that will support ongoing recovery.   Eye contact and following along.   Name: Autumn Hyde YOB: 1980   MR: [id number]      Level of Participation: active  Quality of Participation: engaged  Mood/Affect: appropriate  Progress: Gaining insight or knowledge  Plan: continue with services

## 2024-10-17 NOTE — GROUP NOTE
Group Topic: Chemical Dependency - Relapse   Group Date: 10/17/2024-[facility]  Start Time: 11:00 AM  End Time: 12:00 PM  Facilitators: CORNELL Gandhi   [facility]    Number of Participants: 4   Treatment Modality: Cognitive Behavioral Therapy and Psychoeducation  Interventions utilized were clarification, patient education, and support  Purpose: coping skills, feelings, and relapse prevention strategies  Comment:   Group began with a meditative reading regarding being stubborn and pts. were encouraged to reflect and respond. Members were encouraged to provide the group with an update regarding their recovery progress, as well as discuss shared experiences and observations.    Self relating to peer struggling with depression and poor motivation. Shared new ways she is coping with family dynamics. Encouraged to scheduled counseling appt.   Name: Autumn Hyde YOB: 1980   MR: [id number]      Level of Participation: active  Quality of Participation: engaged  Mood/Affect: appropriate but tearful at times, appropriate to content  Progress: Gaining insight or knowledge  Plan: continue with services

## 2024-10-18 DIAGNOSIS — F32.89 OTHER DEPRESSION: Primary | ICD-10-CM

## 2024-10-18 RX ORDER — INFLUENZA A VIRUS A/VICTORIA/4897/2022 IVR-238 (H1N1) ANTIGEN (FORMALDEHYDE INACTIVATED), INFLUENZA A VIRUS A/DARWIN/9/2021 SAN-010 (H3N2) ANTIGEN (FORMALDEHYDE INACTIVATED), INFLUENZA B VIRUS B/PHUKET/3073/2013 ANTIGEN (FORMALDEHYDE INACTIVATED), AND INFLUENZA B VIRUS B/MICHIGAN/01/2021 ANTIGEN (FORMALDEHYDE INACTIVATED) 15; 15; 15; 15 UG/.5ML; UG/.5ML; UG/.5ML; UG/.5ML
INJECTION, SUSPENSION INTRAMUSCULAR
COMMUNITY
Start: 2023-10-30

## 2024-10-18 RX ORDER — COVID-19 VACCINE, MRNA 50 UG/.5ML
INJECTION, SUSPENSION INTRAMUSCULAR
COMMUNITY
Start: 2023-10-30

## 2024-10-18 RX ORDER — CITALOPRAM 10 MG/1
10 TABLET ORAL DAILY
Qty: 30 TABLET | Refills: 2 | Status: SHIPPED | OUTPATIENT
Start: 2024-10-18

## 2024-10-21 LAB
6MAM UR CFM-MCNC: <25 NG/ML
CODEINE UR CFM-MCNC: <50 NG/ML
HYDROCODONE CTO UR CFM-MCNC: <25 NG/ML
HYDROMORPHONE UR CFM-MCNC: 25 NG/ML
MORPHINE UR CFM-MCNC: <50 NG/ML
NORHYDROCODONE UR CFM-MCNC: 45 NG/ML
NOROXYCODONE UR CFM-MCNC: <25 NG/ML
OXYCODONE UR CFM-MCNC: <25 NG/ML
OXYMORPHONE UR CFM-MCNC: <25 NG/ML

## 2024-10-21 NOTE — CARE PLAN
Problem: D5 Substance free life: Lifestyle which reinforces maintenance of chemical dependency  Goal: STG Patient will discuss key principals of addictive disease and relate them to their personal experience by end of first week of treatment program  Outcome: Progressing  Goal: STG Patient will identify three personal reinforcers of chemical dependency by end of second week of treatment program  Outcome: Progressing  Goal: LTG Patient will establish a structured recovery peer support program during course of treatment program  Outcome: Progressing  Goal: LTG Patient will develop a written plan for continuing treatment post discharge during last scheduled week of treatment  Outcome: Progressing    Level of Care Assessment:  D1: Acute Intx/Withdrawal Potential: 1  D2: Biomedical Conditions/Complications: 1  D3: Emtional Behavioral/Cog. Conditions Complications: 2  D4: Treatment Acceptance/Resistance: 2  D5: Relapse/Cont. Use/Cont. Problem Potential: 2  D6: Recovery Environment: 2  10.3.24 LG UDS remains positive. Pt. Denies any use of substances. Strongly encouraged to attend 12 step meetings and has bene shown several times how to locate these both in person and virtually-AA and MA. Seems more comfortable in the group setting as she is increasing her participation.

## 2024-10-21 NOTE — CARE PLAN
Problem: D5 Substance free life: Lifestyle which reinforces maintenance of chemical dependency  Goal: STG Patient will discuss key principals of addictive disease and relate them to their personal experience by end of first week of treatment program  Outcome: Progressing  Goal: STG Patient will identify three personal reinforcers of chemical dependency by end of second week of treatment program  Outcome: Progressing  Goal: LTG Patient will establish a structured recovery peer support program during course of treatment program  Outcome: Progressing  Goal: LTG Patient will develop a written plan for continuing treatment post discharge during last scheduled week of treatment  Outcome: Progressing    Level of Care Assessment:  D1: Acute Intx/Withdrawal Potential: 1  D2: Biomedical Conditions/Complications: 1  D3: Emtional Behavioral/Cog. Conditions Complications: 1  D4: Treatment Acceptance/Resistance: 2  D5: Relapse/Cont. Use/Cont. Problem Potential: 2  D6: Recovery Environment: 2    10.10.24 LG Last UDS positive for cannabinoids. Denies any substance use. Has attended a 12 step meeting. Not a particularly meaningful experience for her. No Family participation. Frequently focused on relationship/family issues in the home. Now identified positive effects of abstinence.

## 2024-10-21 NOTE — CARE PLAN
Problem: D5 Substance free life: Lifestyle which reinforces maintenance of chemical dependency  Goal: STG Patient will discuss key principals of addictive disease and relate them to their personal experience by end of first week of treatment program  Outcome: Progressing  Goal: STG Patient will identify three personal reinforcers of chemical dependency by end of second week of treatment program  Outcome: Progressing  Goal: LTG Patient will establish a structured recovery peer support program during course of treatment program  Outcome: Progressing  Goal: LTG Patient will develop a written plan for continuing treatment post discharge during last scheduled week of treatment  Outcome: Progressing    Level of Care Assessment:  D1: Acute Intx/Withdrawal Potential: 1  D2: Biomedical Conditions/Complications: 1  D3: Emtional Behavioral/Cog. Conditions Complications: 2  D4: Treatment Acceptance/Resistance: 2  D5: Relapse/Cont. Use/Cont. Problem Potential: 2  D6: Recovery Environment: 2    9.26.24 LG UDS remains positive for cannabinoids. Substances in home. Mood depressed-following up with PCP. Participates in group when prompted. Frequently tearful over relationships at home.

## 2024-10-21 NOTE — CARE PLAN
Problem: D5 Substance free life: Lifestyle which reinforces maintenance of chemical dependency  Goal: STG Patient will discuss key principals of addictive disease and relate them to their personal experience by end of first week of treatment program  Outcome: Progressing  Goal: STG Patient will identify three personal reinforcers of chemical dependency by end of second week of treatment program  Outcome: Progressing  Goal: LTG Patient will establish a structured recovery peer support program during course of treatment program  Outcome: Progressing  Goal: LTG Patient will develop a written plan for continuing treatment post discharge during last scheduled week of treatment  Outcome: Progressing    10.14.24 LG Last UDS negative for cannabinoids but positive for a possible opioid derivative. Active during most groups. Not attending 12 step meetings regularly. Referred for individual counseling (DBT, trauma, substance use) and to follow up with PCP again re:psych meds.

## 2024-10-22 ENCOUNTER — PATIENT MESSAGE (OUTPATIENT)
Dept: CARE COORDINATION | Facility: CLINIC | Age: 44
End: 2024-10-22
Payer: COMMERCIAL

## 2024-10-23 ENCOUNTER — MULTIDISCIPLINARY VISIT (OUTPATIENT)
Dept: BEHAVIORAL HEALTH | Facility: CLINIC | Age: 44
End: 2024-10-23
Payer: COMMERCIAL

## 2024-10-23 DIAGNOSIS — F33.1 MODERATE EPISODE OF RECURRENT MAJOR DEPRESSIVE DISORDER: ICD-10-CM

## 2024-10-23 DIAGNOSIS — F12.20 CANNABIS USE DISORDER, MODERATE, DEPENDENCE (MULTI): Primary | ICD-10-CM

## 2024-10-23 LAB
AMPHETAMINES UR QL SCN: NORMAL
BARBITURATES UR QL SCN: NORMAL
BENZODIAZ UR QL SCN: NORMAL
BZE UR QL SCN: NORMAL
CANNABINOIDS UR QL SCN: NORMAL
FENTANYL+NORFENTANYL UR QL SCN: NORMAL
METHADONE UR QL SCN: NORMAL
OPIATES UR QL SCN: NORMAL
OXYCODONE+OXYMORPHONE UR QL SCN: NORMAL
PCP UR QL SCN: NORMAL

## 2024-10-23 PROCEDURE — 90853 GROUP PSYCHOTHERAPY: CPT

## 2024-10-23 PROCEDURE — 80307 DRUG TEST PRSMV CHEM ANLYZR: CPT

## 2024-10-23 NOTE — PROGRESS NOTES
Ind. Counseling-treatment planning   Reviewed discharge plan and requested documentation for leave of absence. Reviewed UDS and return to work plan. Encouraged again to schedule individual counseling. Patient reports she cannot attend Aftercare as she attends school during the evenings and works during the day shift.   RASHID SARABIA, [facility] [place][note 2]-CS

## 2024-10-23 NOTE — GROUP NOTE
Group Topic: Chemical Dependency - Relapse   Group Date: 10/23/2024  Start Time: 11:00 AM  End Time: 12:00 PM  Facilitators: DENISE Arana; CORNELL Gandhi   [facility]    Number of Participants: 5   Treatment Modality: Cognitive Behavioral Therapy and Dialectical Behavioral Therapy  Interventions utilized were exploration, reality testing, and support  Purpose: feelings, communication skills, and trigger / craving management  Comment: Group therapy with members of the [facility] level of treatment.  It was family day today.  The group started with a reading from “Body, Mind, and Spirit.”  The reading talked about the need to take recovery slowly and not try to rush it along.  This is the “One Day at a Time” principle that  teaches.  Group members shared what they could relate to from today's reading.  The group members then discussed at length recovery and family related recovery issues.        Name: Autumn Hyde YOB: 1980   MR: [id number]      Level of Participation: active  Quality of Participation: appropriate/pleasant, attentive, cooperative, and engaged  Mood/Affect: appropriate  Progress: Gaining insight or knowledge.  Patient was active and engaged in today's group therapy session.  She shared with the group about a couple of challenging situations which she was able to recently successfully navigate.  The group was very supportive and encouraging to her regarding overcoming these two potential sobriety threats.  Patient reports continued abstinence from mind/mood altering substances.    Plan: continue with services

## 2024-10-23 NOTE — GROUP NOTE
Group Topic: Dialectical Behavioral Therapy - Mindfulness   Group Date: 10/23/2024  Start Time:  9:00 AM  End Time: 10:00 AM  Facilitators: DENISE Arana   Department: [facility]    Number of Participants: 5   Treatment Modality: Dialectical Behavioral Therapy  Interventions utilized were exploration, group exercise, and support  Purpose: coping skills, feelings, communication skills, insight or knowledge, and trigger / craving management  Comment: Patient participated in a mindfulness meditation exercise and a group check-in.  The purpose and benefits of mindfulness meditation in strengthening relapse prevention skills was explained.  A mindfulness meditation exercise was conducted, and patient provided feedback in the form of personal observations from the exercise.     Name: Autumn Hyde YOB: 1980   MR: [id number]      Level of Participation: when cued  Quality of Participation: appropriate/pleasant, attentive, cooperative, and engaged  Mood/Affect: appropriate  Progress: Gaining insight or knowledge  Plan: continue with services

## 2024-10-23 NOTE — GROUP NOTE
Group Topic: Chemical Dependency - AA   Group Date: 10/23/2024  Start Time: 10:00 AM  End Time: 11:00 AM  Facilitators: DENISE Arana; CORNELL Gandhi   [facility]    Number of Participants: 5   Treatment Modality: Psychoeducation  Interventions utilized were patient education  Purpose: insight or knowledge  Comment: Education group with patients from the [facility] level of treatment.  It was family day today.  A volunteer from [facility] came to the group and shared their personal and family recovery story.  The volunteer talked extensively about Al-Anon, AA and the 12 steps including what to expect at meetings.  The volunteer also answered questions from the group regarding Al-Anon/12 Step meeting involvement.  Group members were encouraged to attend a minimum of two AA meetings per week to assist them in early recovery, and family members were encouraged to attend Al-Anon.        Name: Autumn Hyde YOB: 1980   MR: [id number]      Level of Participation: when cued  Quality of Participation: appropriate/pleasant, attentive, cooperative, and engaged  Mood/Affect: appropriate  Progress: Gaining insight or knowledge  Plan: continue with services

## 2024-10-24 ENCOUNTER — MULTIDISCIPLINARY VISIT (OUTPATIENT)
Dept: BEHAVIORAL HEALTH | Facility: CLINIC | Age: 44
End: 2024-10-24
Payer: COMMERCIAL

## 2024-10-24 DIAGNOSIS — F12.20 CANNABIS USE DISORDER, MODERATE: ICD-10-CM

## 2024-10-24 PROCEDURE — 90853 GROUP PSYCHOTHERAPY: CPT

## 2024-10-25 LAB — ETHYL GLUCURONIDE UR QL SCN: NEGATIVE NG/ML

## 2024-11-25 DIAGNOSIS — F32.89 OTHER DEPRESSION: ICD-10-CM

## 2024-11-26 RX ORDER — CITALOPRAM 10 MG/1
10 TABLET ORAL DAILY
Qty: 30 TABLET | Refills: 2 | Status: SHIPPED | OUTPATIENT
Start: 2024-11-26

## 2024-12-17 DIAGNOSIS — N32.81 OAB (OVERACTIVE BLADDER): ICD-10-CM

## 2024-12-17 PROCEDURE — RXMED WILLOW AMBULATORY MEDICATION CHARGE

## 2024-12-17 RX ORDER — OXYBUTYNIN CHLORIDE 10 MG/1
10 TABLET, EXTENDED RELEASE ORAL DAILY
Qty: 90 TABLET | Refills: 3 | Status: SHIPPED | OUTPATIENT
Start: 2024-12-17

## 2024-12-18 ENCOUNTER — PHARMACY VISIT (OUTPATIENT)
Dept: PHARMACY | Facility: CLINIC | Age: 44
End: 2024-12-18
Payer: COMMERCIAL

## 2025-01-15 ENCOUNTER — HOSPITAL ENCOUNTER (OUTPATIENT)
Dept: RADIOLOGY | Facility: HOSPITAL | Age: 45
Discharge: HOME | End: 2025-01-15
Payer: COMMERCIAL

## 2025-01-15 VITALS — BODY MASS INDEX: 32.78 KG/M2 | WEIGHT: 185 LBS | HEIGHT: 63 IN

## 2025-01-15 DIAGNOSIS — Z12.31 ENCOUNTER FOR SCREENING MAMMOGRAM FOR MALIGNANT NEOPLASM OF BREAST: ICD-10-CM

## 2025-01-15 PROCEDURE — 77067 SCR MAMMO BI INCL CAD: CPT | Performed by: RADIOLOGY

## 2025-01-15 PROCEDURE — 77067 SCR MAMMO BI INCL CAD: CPT

## 2025-01-15 PROCEDURE — 77063 BREAST TOMOSYNTHESIS BI: CPT | Performed by: RADIOLOGY

## 2025-01-28 ENCOUNTER — APPOINTMENT (OUTPATIENT)
Dept: PRIMARY CARE | Facility: CLINIC | Age: 45
End: 2025-01-28
Payer: COMMERCIAL

## 2025-02-04 ENCOUNTER — TELEPHONE (OUTPATIENT)
Dept: PRIMARY CARE | Facility: CLINIC | Age: 45
End: 2025-02-04

## 2025-02-04 ENCOUNTER — TELEMEDICINE (OUTPATIENT)
Dept: PRIMARY CARE | Facility: CLINIC | Age: 45
End: 2025-02-04
Payer: COMMERCIAL

## 2025-02-04 VITALS — BODY MASS INDEX: 32.78 KG/M2 | HEIGHT: 63 IN | WEIGHT: 185 LBS

## 2025-02-04 DIAGNOSIS — J11.1 FLU SYNDROME: Primary | ICD-10-CM

## 2025-02-04 DIAGNOSIS — J11.1 FLU SYNDROME: ICD-10-CM

## 2025-02-04 PROCEDURE — 1036F TOBACCO NON-USER: CPT | Performed by: FAMILY MEDICINE

## 2025-02-04 PROCEDURE — 3008F BODY MASS INDEX DOCD: CPT | Performed by: FAMILY MEDICINE

## 2025-02-04 PROCEDURE — 99213 OFFICE O/P EST LOW 20 MIN: CPT | Performed by: FAMILY MEDICINE

## 2025-02-04 RX ORDER — OSELTAMIVIR PHOSPHATE 75 MG/1
75 CAPSULE ORAL 2 TIMES DAILY
Qty: 10 CAPSULE | Refills: 0 | Status: SHIPPED | OUTPATIENT
Start: 2025-02-04 | End: 2025-02-04 | Stop reason: SDUPTHER

## 2025-02-04 RX ORDER — OSELTAMIVIR PHOSPHATE 75 MG/1
75 CAPSULE ORAL 2 TIMES DAILY
Qty: 10 CAPSULE | Refills: 0 | Status: SHIPPED | OUTPATIENT
Start: 2025-02-04 | End: 2025-02-12

## 2025-02-04 ASSESSMENT — PATIENT HEALTH QUESTIONNAIRE - PHQ9
SUM OF ALL RESPONSES TO PHQ9 QUESTIONS 1 AND 2: 0
2. FEELING DOWN, DEPRESSED OR HOPELESS: NOT AT ALL
1. LITTLE INTEREST OR PLEASURE IN DOING THINGS: NOT AT ALL

## 2025-02-04 ASSESSMENT — ENCOUNTER SYMPTOMS
COUGH: 1
CHILLS: 1
SHORTNESS OF BREATH: 0
WHEEZING: 0
FATIGUE: 0
FEVER: 0
SORE THROAT: 1

## 2025-02-04 NOTE — TELEPHONE ENCOUNTER
Call to patient made. Left message letting patient know the medication was called into [facility] pharmacy, per her request.

## 2025-02-04 NOTE — TELEPHONE ENCOUNTER
Patient called office asking if you could send the Tamiflu to [facility] pharmacy please.

## 2025-02-04 NOTE — PROGRESS NOTES
"Subjective   Patient ID: Autumn Hyde is a 44 y.o. female who presents for Sore Throat.    Sore Throat   Associated symptoms include coughing. Pertinent negatives include no shortness of breath.    This visit was completed via audio and visual technology. All issues as below were discussed and addressed and a limited physical exam within the constraints of the technology was performed. If it was felt that the patient should be evaluated in clinic then they were directed there. Verbal consent was requested and obtained from parent/guardian to provide this telehealth service on this date for a telehealth visit.  I spent 8 minutes with patient and/or family, face to face and more than 50% of this time was spent in counseling and coordination of care.     Sore throat, chills, diarrhea, cough -minimal. No recorded fever. No nausea or shortness of breath. Sick for one day. Has had sick contacts.     Review of Systems   Constitutional:  Positive for chills. Negative for fatigue and fever.   HENT:  Positive for sore throat.    Respiratory:  Positive for cough. Negative for shortness of breath and wheezing.        Objective   Ht 1.6 m (5' 3\")   Wt 83.9 kg (185 lb)   BMI 32.77 kg/m²     Physical Exam  Constitutional:       Appearance: Normal appearance.   HENT:      Head: Normocephalic.   Neurological:      General: No focal deficit present.      Mental Status: She is alert and oriented to person, place, and time.         Assessment/Plan   Problem List Items Addressed This Visit    None  Visit Diagnoses         Codes    Flu syndrome    -  Primary J11.1          Try tamiflu, sx nmnagement with mucinex, antihistamine, call if no better or worse      "

## 2025-02-05 PROCEDURE — RXMED WILLOW AMBULATORY MEDICATION CHARGE

## 2025-02-07 ENCOUNTER — PHARMACY VISIT (OUTPATIENT)
Dept: PHARMACY | Facility: CLINIC | Age: 45
End: 2025-02-07
Payer: COMMERCIAL

## 2025-03-12 DIAGNOSIS — F32.89 OTHER DEPRESSION: ICD-10-CM

## 2025-03-12 RX ORDER — CITALOPRAM 10 MG/1
10 TABLET ORAL DAILY
Qty: 30 TABLET | Refills: 3 | Status: SHIPPED | OUTPATIENT
Start: 2025-03-12

## 2025-03-31 DIAGNOSIS — N32.81 OAB (OVERACTIVE BLADDER): ICD-10-CM

## 2025-03-31 PROCEDURE — RXMED WILLOW AMBULATORY MEDICATION CHARGE

## 2025-03-31 RX ORDER — OXYBUTYNIN CHLORIDE 10 MG/1
10 TABLET, EXTENDED RELEASE ORAL DAILY
Qty: 90 TABLET | Refills: 3 | Status: SHIPPED | OUTPATIENT
Start: 2025-03-31

## 2025-04-03 ENCOUNTER — PHARMACY VISIT (OUTPATIENT)
Dept: PHARMACY | Facility: CLINIC | Age: 45
End: 2025-04-03
Payer: COMMERCIAL

## 2025-04-05 ENCOUNTER — APPOINTMENT (OUTPATIENT)
Dept: CARDIOLOGY | Facility: HOSPITAL | Age: 45
End: 2025-04-05
Payer: COMMERCIAL

## 2025-04-05 ENCOUNTER — APPOINTMENT (OUTPATIENT)
Dept: RADIOLOGY | Facility: HOSPITAL | Age: 45
End: 2025-04-05
Payer: COMMERCIAL

## 2025-04-05 ENCOUNTER — HOSPITAL ENCOUNTER (EMERGENCY)
Facility: HOSPITAL | Age: 45
Discharge: HOME | End: 2025-04-05
Payer: COMMERCIAL

## 2025-04-05 VITALS
SYSTOLIC BLOOD PRESSURE: 129 MMHG | OXYGEN SATURATION: 97 % | DIASTOLIC BLOOD PRESSURE: 97 MMHG | HEART RATE: 61 BPM | RESPIRATION RATE: 13 BRPM | BODY MASS INDEX: 35.33 KG/M2 | TEMPERATURE: 97.7 F | WEIGHT: 192 LBS | HEIGHT: 62 IN

## 2025-04-05 DIAGNOSIS — K21.9 GASTROESOPHAGEAL REFLUX DISEASE, UNSPECIFIED WHETHER ESOPHAGITIS PRESENT: Primary | ICD-10-CM

## 2025-04-05 LAB
ALBUMIN SERPL BCP-MCNC: 4.6 G/DL (ref 3.4–5)
ALP SERPL-CCNC: 81 U/L (ref 33–110)
ALT SERPL W P-5'-P-CCNC: 28 U/L (ref 7–45)
ANION GAP SERPL CALC-SCNC: 14 MMOL/L (ref 10–20)
APPEARANCE UR: CLEAR
AST SERPL W P-5'-P-CCNC: 21 U/L (ref 9–39)
BASOPHILS # BLD AUTO: 0.02 X10*3/UL (ref 0–0.1)
BASOPHILS NFR BLD AUTO: 0.2 %
BILIRUB SERPL-MCNC: 0.5 MG/DL (ref 0–1.2)
BILIRUB UR STRIP.AUTO-MCNC: NEGATIVE MG/DL
BNP SERPL-MCNC: 17 PG/ML (ref 0–99)
BUN SERPL-MCNC: 12 MG/DL (ref 6–23)
CALCIUM SERPL-MCNC: 9.8 MG/DL (ref 8.6–10.3)
CARDIAC TROPONIN I PNL SERPL HS: <3 NG/L (ref 0–13)
CARDIAC TROPONIN I PNL SERPL HS: <3 NG/L (ref 0–13)
CHLORIDE SERPL-SCNC: 102 MMOL/L (ref 98–107)
CO2 SERPL-SCNC: 26 MMOL/L (ref 21–32)
COLOR UR: YELLOW
CREAT SERPL-MCNC: 0.81 MG/DL (ref 0.5–1.05)
EGFRCR SERPLBLD CKD-EPI 2021: >90 ML/MIN/1.73M*2
EOSINOPHIL # BLD AUTO: 0.03 X10*3/UL (ref 0–0.7)
EOSINOPHIL NFR BLD AUTO: 0.3 %
ERYTHROCYTE [DISTWIDTH] IN BLOOD BY AUTOMATED COUNT: 13.2 % (ref 11.5–14.5)
GLUCOSE SERPL-MCNC: 77 MG/DL (ref 74–99)
GLUCOSE UR STRIP.AUTO-MCNC: NORMAL MG/DL
HCT VFR BLD AUTO: 43.9 % (ref 36–46)
HGB BLD-MCNC: 15.2 G/DL (ref 12–16)
IMM GRANULOCYTES # BLD AUTO: 0.02 X10*3/UL (ref 0–0.7)
IMM GRANULOCYTES NFR BLD AUTO: 0.2 % (ref 0–0.9)
INR PPP: 1.1 (ref 0.9–1.1)
KETONES UR STRIP.AUTO-MCNC: NEGATIVE MG/DL
LEUKOCYTE ESTERASE UR QL STRIP.AUTO: NEGATIVE
LIPASE SERPL-CCNC: 23 U/L (ref 9–82)
LYMPHOCYTES # BLD AUTO: 2.5 X10*3/UL (ref 1.2–4.8)
LYMPHOCYTES NFR BLD AUTO: 28.3 %
MAGNESIUM SERPL-MCNC: 1.93 MG/DL (ref 1.6–2.4)
MCH RBC QN AUTO: 28.7 PG (ref 26–34)
MCHC RBC AUTO-ENTMCNC: 34.6 G/DL (ref 32–36)
MCV RBC AUTO: 83 FL (ref 80–100)
MONOCYTES # BLD AUTO: 0.33 X10*3/UL (ref 0.1–1)
MONOCYTES NFR BLD AUTO: 3.7 %
NEUTROPHILS # BLD AUTO: 5.93 X10*3/UL (ref 1.2–7.7)
NEUTROPHILS NFR BLD AUTO: 67.3 %
NITRITE UR QL STRIP.AUTO: NEGATIVE
NRBC BLD-RTO: 0 /100 WBCS (ref 0–0)
PH UR STRIP.AUTO: 7 [PH]
PLATELET # BLD AUTO: 223 X10*3/UL (ref 150–450)
POTASSIUM SERPL-SCNC: 3.8 MMOL/L (ref 3.5–5.3)
PROT SERPL-MCNC: 7.5 G/DL (ref 6.4–8.2)
PROT UR STRIP.AUTO-MCNC: NEGATIVE MG/DL
PROTHROMBIN TIME: 12.3 SECONDS (ref 9.8–12.4)
RBC # BLD AUTO: 5.3 X10*6/UL (ref 4–5.2)
RBC # UR STRIP.AUTO: NEGATIVE MG/DL
SODIUM SERPL-SCNC: 138 MMOL/L (ref 136–145)
SP GR UR STRIP.AUTO: 1.01
UROBILINOGEN UR STRIP.AUTO-MCNC: NORMAL MG/DL
WBC # BLD AUTO: 8.8 X10*3/UL (ref 4.4–11.3)

## 2025-04-05 PROCEDURE — 83690 ASSAY OF LIPASE: CPT | Performed by: HEALTH CARE PROVIDER

## 2025-04-05 PROCEDURE — 99285 EMERGENCY DEPT VISIT HI MDM: CPT

## 2025-04-05 PROCEDURE — 2500000005 HC RX 250 GENERAL PHARMACY W/O HCPCS: Performed by: HEALTH CARE PROVIDER

## 2025-04-05 PROCEDURE — 80053 COMPREHEN METABOLIC PANEL: CPT | Performed by: HEALTH CARE PROVIDER

## 2025-04-05 PROCEDURE — 84484 ASSAY OF TROPONIN QUANT: CPT | Performed by: HEALTH CARE PROVIDER

## 2025-04-05 PROCEDURE — 96374 THER/PROPH/DIAG INJ IV PUSH: CPT

## 2025-04-05 PROCEDURE — 85610 PROTHROMBIN TIME: CPT | Performed by: HEALTH CARE PROVIDER

## 2025-04-05 PROCEDURE — 36415 COLL VENOUS BLD VENIPUNCTURE: CPT | Performed by: HEALTH CARE PROVIDER

## 2025-04-05 PROCEDURE — 85025 COMPLETE CBC W/AUTO DIFF WBC: CPT | Performed by: HEALTH CARE PROVIDER

## 2025-04-05 PROCEDURE — 81003 URINALYSIS AUTO W/O SCOPE: CPT | Performed by: HEALTH CARE PROVIDER

## 2025-04-05 PROCEDURE — 2500000004 HC RX 250 GENERAL PHARMACY W/ HCPCS (ALT 636 FOR OP/ED): Performed by: HEALTH CARE PROVIDER

## 2025-04-05 PROCEDURE — 71045 X-RAY EXAM CHEST 1 VIEW: CPT | Performed by: RADIOLOGY

## 2025-04-05 PROCEDURE — 83880 ASSAY OF NATRIURETIC PEPTIDE: CPT | Performed by: HEALTH CARE PROVIDER

## 2025-04-05 PROCEDURE — 83735 ASSAY OF MAGNESIUM: CPT | Performed by: HEALTH CARE PROVIDER

## 2025-04-05 PROCEDURE — 93005 ELECTROCARDIOGRAM TRACING: CPT

## 2025-04-05 PROCEDURE — 2500000001 HC RX 250 WO HCPCS SELF ADMINISTERED DRUGS (ALT 637 FOR MEDICARE OP): Performed by: HEALTH CARE PROVIDER

## 2025-04-05 PROCEDURE — 71045 X-RAY EXAM CHEST 1 VIEW: CPT

## 2025-04-05 RX ORDER — PANTOPRAZOLE SODIUM 20 MG/1
20 TABLET, DELAYED RELEASE ORAL DAILY
Qty: 30 TABLET | Refills: 0 | Status: SHIPPED | OUTPATIENT
Start: 2025-04-05 | End: 2025-05-05

## 2025-04-05 RX ORDER — LIDOCAINE HYDROCHLORIDE 20 MG/ML
15 SOLUTION OROPHARYNGEAL ONCE
Status: COMPLETED | OUTPATIENT
Start: 2025-04-05 | End: 2025-04-05

## 2025-04-05 RX ORDER — NAPROXEN SODIUM 220 MG/1
324 TABLET, FILM COATED ORAL ONCE
Status: COMPLETED | OUTPATIENT
Start: 2025-04-05 | End: 2025-04-05

## 2025-04-05 RX ORDER — FAMOTIDINE 10 MG/ML
20 INJECTION, SOLUTION INTRAVENOUS ONCE
Status: COMPLETED | OUTPATIENT
Start: 2025-04-05 | End: 2025-04-05

## 2025-04-05 RX ORDER — ALUMINUM HYDROXIDE, MAGNESIUM HYDROXIDE, AND SIMETHICONE 1200; 120; 1200 MG/30ML; MG/30ML; MG/30ML
30 SUSPENSION ORAL ONCE
Status: COMPLETED | OUTPATIENT
Start: 2025-04-05 | End: 2025-04-05

## 2025-04-05 RX ADMIN — ALUMINUM HYDROXIDE, MAGNESIUM HYDROXIDE, AND DIMETHICONE 30 ML: 200; 20; 200 SUSPENSION ORAL at 15:18

## 2025-04-05 RX ADMIN — FAMOTIDINE 20 MG: 10 INJECTION, SOLUTION INTRAVENOUS at 15:19

## 2025-04-05 RX ADMIN — LIDOCAINE HYDROCHLORIDE 15 ML: 20 SOLUTION ORAL at 15:18

## 2025-04-05 RX ADMIN — ASPIRIN 324 MG: 81 TABLET, CHEWABLE ORAL at 15:18

## 2025-04-05 ASSESSMENT — PAIN DESCRIPTION - DESCRIPTORS: DESCRIPTORS: STABBING

## 2025-04-05 ASSESSMENT — LIFESTYLE VARIABLES
HAVE PEOPLE ANNOYED YOU BY CRITICIZING YOUR DRINKING: NO
TOTAL SCORE: 0
EVER HAD A DRINK FIRST THING IN THE MORNING TO STEADY YOUR NERVES TO GET RID OF A HANGOVER: NO
HAVE YOU EVER FELT YOU SHOULD CUT DOWN ON YOUR DRINKING: NO
EVER FELT BAD OR GUILTY ABOUT YOUR DRINKING: NO

## 2025-04-05 ASSESSMENT — HEART SCORE
ECG: NORMAL
RISK FACTORS: 1-2 RISK FACTORS
HEART SCORE: 1
TROPONIN: LESS THAN OR EQUAL TO NORMAL LIMIT
AGE: <45
HISTORY: SLIGHTLY SUSPICIOUS

## 2025-04-05 ASSESSMENT — PAIN DESCRIPTION - PAIN TYPE: TYPE: ACUTE PAIN

## 2025-04-05 ASSESSMENT — PAIN SCALES - GENERAL
PAINLEVEL_OUTOF10: 4
PAINLEVEL_OUTOF10: 8

## 2025-04-05 ASSESSMENT — PAIN - FUNCTIONAL ASSESSMENT: PAIN_FUNCTIONAL_ASSESSMENT: 0-10

## 2025-04-05 ASSESSMENT — PAIN DESCRIPTION - ORIENTATION: ORIENTATION: MID

## 2025-04-05 ASSESSMENT — PAIN DESCRIPTION - LOCATION: LOCATION: CHEST

## 2025-04-05 NOTE — ED TRIAGE NOTES
Patient presents with midsternal chest pain, intermittent for 2 days. Denies SOB/n/v. Stabbing 8/10 pain, became more constant today

## 2025-04-05 NOTE — ED PROVIDER NOTES
HPI   Chief Complaint   Patient presents with    Chest Pain       CC: Chest pain  HPI:   44-year-old female presents ED complaining of substernal chest pain patient reported having brief episodes of chest pain for the past couple of days that would resolve after she took some Tums.  She woke up this morning still having the chest pain and came to the emergency room.  She has a history of tobacco use and currently vapes, she denies any hypertension hyperlipidemia.  Patient notes pain is mostly in the epigastric substernal region and reported having moderate heartburn last night.  She denies any associated headache, dizziness, denies any nausea, vomiting, denies any family history of coronary artery disease.    Additional Limitations to History:   External Records Reviewed: I reviewed recent and relevant outside records including   History Obtained From:     Past Medical History: Per HPI  Medications: Reviewed in EMR and with patient  Allergies:  Reviewed in EMR  Past Surgical History:   Social History:     ------------------------------------------------------------------------------------------------------  Physical Exam:  --Vital signs reviewed in nursing triage note, EMR flow sheets, and at patient's bedside  GEN:  A&Ox3, no acute distress, appears comfortable.  Conversational and appropriate.  No confusion or gross mental status changes.  EYES: EOMI, non-injected sclera.  ENT: Moist mucous membranes, no apparent injuries or lesions.   CARDIO: Normal rate and regular rhythm. No murmurs, rubs, or gallops.  2+ equal pulses of the distal extremities.   PULM: Clear to auscultation bilaterally. No rales, rhonchi, or wheezes. Good symmetric chest expansion.  GI: Soft, non-tender, non-distended. No rebound tenderness or guarding.  SKIN: Warm and dry, no rashes or lesions.  MSK: ROM intact the extremities without contractures.   EXT: No peripheral edema, contusions, or wounds.   NEURO: Cranial nerves II-XII grossly  intact. Sensation to light touch intact and equal bilaterally in upper and lower extremities.  Symmetric 5/5 strength in upper and lower extremities.  PSYCH: Appropriate mood and behavior, converses and responds appropriately during exam.  -------------------------------------------------------------------------------------------------------      Differential Diagnoses Considered:   Chronic Medical Conditions Significantly Affecting Care:   Diagnostic testing considered: [PERC, D-Dimer, PECARN, etc.]    - EKG interpreted by myself normal sinus rhythm ventricular rate 67 ME interval 124 normal QRS duration no prolonged QT/QTc no obvious ST elevation, depression or acute ischemic findings normal axis  - I independently interpreted: [CXR, CT, POCUS, etc. including your interpretation]  - Labs notable for     Escalation of Care: Appropriate for   Social Determinants of Health Significantly Affecting Care: [Homelessness, lacking transportation, uninsured, unable to afford medications]  Prescription Drug Consideration: [Antibiotics, antivirals, pain medications, etc.]  Discussion of Management with Other Providers:  I discussed the patient/results with: [admitting team, consultant, radiologist, social work, EPAT, case management, PT/OT, RT, PCP, etc.]      Woody Alexandre PA-C              Patient History   Past Medical History:   Diagnosis Date    Acute depression     Acute sinusitis 05/10/2023    Acute upper respiratory infection, unspecified 11/17/2015    Viral URI    Chlamydial vulvovaginitis 07/01/2020    Chlamydia vaginitis/cervicitis    Chronic low back pain 05/10/2023    CTS (carpal tunnel syndrome)     Diarrhea 05/10/2023    GERD (gastroesophageal reflux disease)     Gonococcal infection, unspecified 07/02/2020    Gonorrhea in female    Obesity, unspecified 04/15/2019    Obesity, Class II, BMI 35-39.9    Pain in both feet 05/10/2023    Pelvic and perineal pain     Vaginal pain    Personal history of other  infectious and parasitic diseases 2020    History of gonorrhea    Personal history of other specified conditions 2020    History of abdominal pain    Personal history of other specified conditions 2020    History of wheezing    Personal history of other specified conditions 2017    History of fatigue    Sprain of ankle, right 05/10/2023    Stress incontinence     Ulceration of vulva 2020    Vulvar ulcer    Umbilical hernia 05/10/2023    Urinary frequency 05/10/2023    Urinary tract infection, site not specified 2020    Acute urinary tract infection     Past Surgical History:   Procedure Laterality Date    OTHER SURGICAL HISTORY  2016    Abd Aorta Aneurysm Repair 2 Dock Limbs W/ Visc Extens Prosth    OTHER SURGICAL HISTORY  10/15/2020    Hernia repair    TUBAL LIGATION  2016    Tubal Ligation    WISDOM TOOTH EXTRACTION       Family History   Problem Relation Name Age of Onset    Lung cancer Father      Breast cancer Maternal Grandmother zeus gay 60 - 69     Social History     Tobacco Use    Smoking status: Former     Current packs/day: 0.00     Types: Cigarettes     Quit date:      Years since quittin.2    Smokeless tobacco: Never   Vaping Use    Vaping status: Never Used   Substance Use Topics    Alcohol use: Not Currently     Comment: social    Drug use: Never       Physical Exam   ED Triage Vitals [25 1419]   Temperature Heart Rate Respirations BP   36.5 °C (97.7 °F) 75 16 148/85      Pulse Ox Temp Source Heart Rate Source Patient Position   96 % Temporal Monitor Sitting      BP Location FiO2 (%)     Left arm --       Physical Exam      ED Course & MDM   ED Course as of 25 1726   Sat 2025   1424 EKG read by me reviewed by me as normal sinus rhythm at 67 bpm.  Normal axis.  Normal intervals.  No significant ST segment elevation or depression. [HD]      ED Course User Index  [HD] Katya Serrato DO         Diagnoses as of 25  1726   Gastroesophageal reflux disease, unspecified whether esophagitis present                 No data recorded     [place] Coma Scale Score: 15 (04/05/25 1418 : Heather Goodman, CONSUELO)                           Medical Decision Making  44-year-old female  with epigastric, chest pain likely in the setting of GERD with or without esophagitis, there is low suspicion for acute coronary event or pulmonary emboli, heart score is low, serial troponins normal, and her remaining laboratory workup appears unremarkable, she received Pepcid, GI cocktail, and is currently asymptomatic, I I advised patient to avoid trigger foods, diet modification, and will place patient on Protonix daily for 30 days.  Referral placed to gastroenterology for follow-up patient received instructions return to ED if she does continue to develop any chest pain, shortness of breath if she develops any fevers chills or increased abdominal pain.        Procedure  Procedures     Woody Alexandre PA-C  04/05/25 1503       Woody Alexandre PA-C  04/05/25 172

## 2025-04-06 LAB — HOLD SPECIMEN: NORMAL

## 2025-04-07 LAB
ATRIAL RATE: 67 BPM
P AXIS: 59 DEGREES
P OFFSET: 209 MS
P ONSET: 158 MS
PR INTERVAL: 124 MS
Q ONSET: 220 MS
QRS COUNT: 11 BEATS
QRS DURATION: 80 MS
QT INTERVAL: 390 MS
QTC CALCULATION(BAZETT): 412 MS
QTC FREDERICIA: 404 MS
R AXIS: 78 DEGREES
T AXIS: 65 DEGREES
T OFFSET: 415 MS
VENTRICULAR RATE: 67 BPM

## 2025-04-23 ENCOUNTER — APPOINTMENT (OUTPATIENT)
Facility: CLINIC | Age: 45
End: 2025-04-23
Payer: COMMERCIAL

## 2025-04-23 VITALS — WEIGHT: 194 LBS | HEART RATE: 80 BPM | HEIGHT: 62 IN | BODY MASS INDEX: 35.7 KG/M2

## 2025-04-23 DIAGNOSIS — R13.19 ESOPHAGEAL DYSPHAGIA: Primary | ICD-10-CM

## 2025-04-23 DIAGNOSIS — R07.89 ATYPICAL CHEST PAIN: ICD-10-CM

## 2025-04-23 DIAGNOSIS — K21.9 GASTROESOPHAGEAL REFLUX DISEASE, UNSPECIFIED WHETHER ESOPHAGITIS PRESENT: ICD-10-CM

## 2025-04-23 PROCEDURE — 99214 OFFICE O/P EST MOD 30 MIN: CPT | Performed by: STUDENT IN AN ORGANIZED HEALTH CARE EDUCATION/TRAINING PROGRAM

## 2025-04-23 PROCEDURE — 3008F BODY MASS INDEX DOCD: CPT | Performed by: STUDENT IN AN ORGANIZED HEALTH CARE EDUCATION/TRAINING PROGRAM

## 2025-04-23 RX ORDER — PANTOPRAZOLE SODIUM 20 MG/1
20 TABLET, DELAYED RELEASE ORAL DAILY
Qty: 30 TABLET | Refills: 3 | Status: SHIPPED | OUTPATIENT
Start: 2025-04-23 | End: 2025-08-21

## 2025-04-23 ASSESSMENT — ENCOUNTER SYMPTOMS
RECTAL PAIN: 0
CHILLS: 0
UNEXPECTED WEIGHT CHANGE: 0
TROUBLE SWALLOWING: 1
COLOR CHANGE: 0
BLOOD IN STOOL: 0
VOMITING: 0
CONSTIPATION: 0
ABDOMINAL PAIN: 0
NAUSEA: 0
ABDOMINAL DISTENTION: 0
DIARRHEA: 0
SHORTNESS OF BREATH: 0
FEVER: 0
ANAL BLEEDING: 0

## 2025-04-23 NOTE — ASSESSMENT & PLAN NOTE
Chronic dysphagia with chest pain. 2 prior EGD 2020 w LA grade A esophagitis and most recently 2023 normal including random esophageal bx. Unclear if she was on PPI at the time. Her sxs do improve but do not resolve with PPI, Mom also with dyspahgia requiring surgery and dilation. Unclear etiology of dysphagia, could be due to GERD vs EoE vs motility disorder. Discussed manometry which patient is unsure she can tolerate due to gag reflux. Will plan for EGD with rpt bx in distal/proximal esophagus (given she has been off PPI for some time and now only low dose) and manometry probe placement. 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

## 2025-04-23 NOTE — PROGRESS NOTES
"Chief Complaint:  Chief Complaint   Patient presents with    GERD       Here after ED visit 4/5/25 for chest pain.     Chest pain feels like punching and stabbing in middle of chest when eating.   Food is slow to go down and feels like stuck. Tries to cough or drink water but nothing helps. Eventually it passes.   No prior food impaction.   Mostly solid food dysphagia, no particular foods,  can be due to ice cream  Coughing with liquids  No pill dysphagia    Denies chest burning. \"A little bit \" of regurgitation. Usually a couple hours after eating feels like bubbles  Denies vomiting    No significant weight change.     Currently on pantoprazole 20mg since ED visit. Some improvement   Was on Pantoprazole 40mg every day prior still with symptoms but less frequent.     Mom with similar issues, treated with meds then needed surgery and stretching of esophagus    Two prior EGDs for dysphagia.   EGD 2020 with   with LA Grade A esohagitis, no esophageal bx done  EGD 2023 with  as below, patient unclear if she was on PPI at the time. Random esophageal bx normal .    Study Result  Patient Name: Autumn Hyde  Procedure Date: 8/16/2023 8:15 AM  MRN: [id number]  Account Number: [id number]  YOB: 1980  Admit Type: Outpatient  Site: [facility] Endoscopy Room 1  Ethnicity: Not  or   Race: White  Attending MD: Sylvester Hewitt MD, [id number]  Referring MD: GENIE Ayala  Attending Participation:  I personally performed the entire procedure.  Procedure: Upper GI endoscopy  Indications: Dysphagia, Gastro-esophageal reflux  disease.esophagitis. Previous EGD in 2020 by Dr. Sánchez found LA grade A reflux esophagitis, mild  gastritis.  Providers: Sylvester Hewitt MD (Doctor)  Medicines: Monitored Anesthesia Care  Complications: No immediate complications.  Procedure: Pre-Anesthesia Assessment:  - The risks and benefits of the procedure and the  sedation options and risks were " discussed with the  patient. All questions were answered and informed  consent was obtained.  - Patient identification and proposed procedure were  verified prior to the procedure by the physician, the  nurse and the anesthesiologist. The procedure was  verified in the endoscopy suite.  - CV Examination: normal.  - Mental Status Examination: alert and oriented.  - Respiratory Examination: clear to auscultation.  - ASA Grade Assessment: II - A patient with mild  systemic disease.  After obtaining informed consent, and time out was  performed, the endoscope was passed under direct  vision. Throughout the procedure, the patient's blood  pressure, pulse, and oxygen saturations were monitored  continuously. The Diagnostic Upper Endoscope was  introduced through the mouth, and advanced to the  second part of duodenum. The upper GI endoscopy was  accomplished without difficulty. The patient tolerated  the procedure well.  Estimated Blood Loss:  Estimated blood loss was minimal.  Findings:  The Z-line was irregular and was found 35 cm from the incisors. This was  biopsied with a cold forceps for histology.  A small hiatal hernia was present.  No endoscopic abnormality was evident in the esophagus to explain the  patient's complaint of dysphagia. This was biopsied with a cold forceps  for evaluation of eosinophilic esophagitis.  Localized mildly erythematous mucosa without bleeding was found in the  gastric antrum. Biopsies were taken with a cold forceps for Helicobacter  pylori testing.  The examined duodenum was normal. Biopsies for histology were taken with  a cold forceps for evaluation of celiac disease.  Impression: - Z-line irregular, 35 cm from the incisors. Biopsied.  - Small hiatal hernia.  - No endoscopic esophageal abnormality to explain  patient's dysphagia. Biopsied.  - Erythematous mucosa in the antrum. Biopsied.  - Normal examined duodenum. Biopsied.  Recommendation: - Await pathology results.  - Discharge  "patient to home.  - Advance diet as tolerated.  - Continue present medications.  - Return to GI clinic as previously scheduled.      GERD  She complains of chest pain. She reports no abdominal pain or no nausea.               Review of Systems   Constitutional:  Negative for chills, fever and unexpected weight change.   HENT:  Positive for trouble swallowing.    Respiratory:  Negative for shortness of breath.    Cardiovascular:  Positive for chest pain.   Gastrointestinal:  Negative for abdominal distention, abdominal pain, anal bleeding, blood in stool, constipation, diarrhea, nausea, rectal pain and vomiting.   Skin:  Negative for color change.     Family History[1]    Medications  Current Medications[2]    Vitals  Pulse 80   Ht 1.575 m (5' 2\")   Wt 88 kg (194 lb)   BMI 35.48 kg/m²     Physical Exam  Constitutional:       General: She is not in acute distress.  HENT:      Head: Normocephalic and atraumatic.      Mouth/Throat:      Mouth: Mucous membranes are moist.      Pharynx: No oropharyngeal exudate.   Eyes:      General: No scleral icterus.     Conjunctiva/sclera: Conjunctivae normal.   Cardiovascular:      Rate and Rhythm: Normal rate.      Heart sounds: Normal heart sounds.   Pulmonary:      Effort: Pulmonary effort is normal.      Breath sounds: No wheezing.   Abdominal:      General: Bowel sounds are normal. There is no distension.      Palpations: Abdomen is soft.      Tenderness: There is no abdominal tenderness. There is no guarding or rebound.      Hernia: No hernia is present.   Skin:     General: Skin is warm.      Coloration: Skin is not jaundiced.   Neurological:      General: No focal deficit present.      Mental Status: She is alert and oriented to person, place, and time.   Psychiatric:         Mood and Affect: Mood normal.           Labs:  No results found for: \"AFP\"No results found for: \"ASMAB\", \"MITOAB\"No results found for: \"ALEXANDREA\"No results found for: \"ASMAB\", \"MITOAB\"  Lab Results " "  Component Value Date    NLICWAWT30 277 05/10/2023   No results found for: \"HEPCAB\"No results found for: \"HEPATOT\", \"HEPAIGM\", \"HEPBCIGM\", \"HEPBCAB\", \"HBEAG\", \"HEPCAB\"No results found for: \"HIV1X2\"No results found for: \"IRON\", \"TIBC\", \"FERRITIN\"  Lab Results   Component Value Date    INR 1.1 04/05/2025    PROTIME 12.3 04/05/2025     Lab Results   Component Value Date    TSH 1.68 07/30/2024       Radiology  No image results found.      A/P   Autumn was seen today for gerd.  Diagnoses and all orders for this visit:  Esophageal dysphagia (Primary)  -     Esophagogastroduodenoscopy (EGD); Future  -     Follow Up In Gastroenterology; Future  Gastroesophageal reflux disease, unspecified whether esophagitis present  -     Referral to Gastroenterology  -     Esophagogastroduodenoscopy (EGD); Future  -     pantoprazole (ProtoNix) 20 mg EC tablet; Take 1 tablet (20 mg) by mouth once daily. Do not crush, chew, or split.  -     Follow Up In Gastroenterology; Future  Atypical chest pain     Problem List Items Addressed This Visit           ICD-10-CM    GERD (gastroesophageal reflux disease) K21.9    Relevant Medications    pantoprazole (ProtoNix) 20 mg EC tablet    Other Relevant Orders    Esophagogastroduodenoscopy (EGD)    Follow Up In Gastroenterology    Dysphagia - Primary R13.10    Chronic dysphagia with chest pain. 2 prior EGD 2020 w LA grade A esophagitis and most recently 2023 normal including random esophageal bx. Unclear if she was on PPI at the time. Her sxs do improve but do not resolve with PPI, Mom also with dyspahgia requiring surgery and dilation. Unclear etiology of dysphagia, could be due to GERD vs EoE vs motility disorder. Discussed manometry which patient is unsure she can tolerate due to gag reflux. Will plan for EGD with rpt bx in distal/proximal esophagus (given she has been off PPI for some time and now only low dose) and manometry probe placement. 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  - EGD with manometry probe placement  and re-biopsy for EoE  - c/w PPI 20mg for now, further dose adjustment based on sxs           Relevant Orders    Esophagogastroduodenoscopy (EGD)    Follow Up In Gastroenterology    Atypical chest pain R07.89            [1]   Family History  Problem Relation Name Age of Onset    Lung cancer Father      Breast cancer Maternal Grandmother zeus gay 60 - 69   [2]   Current Outpatient Medications:     albuterol 90 mcg/actuation inhaler, Inhale 2 puffs every 6 hours if needed for shortness of breath or wheezing., Disp: , Rfl:     citalopram (CeleXA) 10 mg tablet, TAKE ONE TABLET BY MOUTH ONCE A  DAY, Disp: 30 tablet, Rfl: 3    oxybutynin XL (Ditropan-XL) 10 mg 24 hr tablet, Take 1 tablet (10 mg) by mouth once daily., Disp: 90 tablet, Rfl: 3    Fluzone Quad 9094-1417, PF, syringe, , Disp: , Rfl:     levothyroxine (Synthroid, Levoxyl) 50 mcg tablet, Take 1 tablet (50 mcg) by mouth once daily. (Patient not taking: Reported on 4/23/2025), Disp: , Rfl:     pantoprazole (ProtoNix) 20 mg EC tablet, Take 1 tablet (20 mg) by mouth once daily. Do not crush, chew, or split., Disp: 30 tablet, Rfl: 3    Spikevax 5520-8386,12y up,,PF, 50 mcg/0.5 mL syringe, , Disp: , Rfl:     traZODone (Desyrel) 50 mg tablet, Take 1 tablet (50 mg) by mouth as needed at bedtime for sleep. (Patient not taking: Reported on 4/23/2025), Disp: 30 tablet, Rfl: 2

## 2025-04-28 ENCOUNTER — PHARMACY VISIT (OUTPATIENT)
Dept: PHARMACY | Facility: CLINIC | Age: 45
End: 2025-04-28
Payer: COMMERCIAL

## 2025-04-28 DIAGNOSIS — R13.19 ESOPHAGEAL DYSPHAGIA: Primary | ICD-10-CM

## 2025-04-28 DIAGNOSIS — R07.89 ATYPICAL CHEST PAIN: ICD-10-CM

## 2025-04-28 PROCEDURE — RXMED WILLOW AMBULATORY MEDICATION CHARGE

## 2025-05-27 ENCOUNTER — APPOINTMENT (OUTPATIENT)
Dept: GASTROENTEROLOGY | Facility: CLINIC | Age: 45
End: 2025-05-27
Payer: COMMERCIAL

## 2025-05-28 ENCOUNTER — APPOINTMENT (OUTPATIENT)
Dept: PRIMARY CARE | Facility: CLINIC | Age: 45
End: 2025-05-28
Payer: COMMERCIAL

## 2025-05-28 VITALS
HEART RATE: 73 BPM | SYSTOLIC BLOOD PRESSURE: 128 MMHG | DIASTOLIC BLOOD PRESSURE: 76 MMHG | HEIGHT: 62 IN | TEMPERATURE: 98.1 F | BODY MASS INDEX: 36.03 KG/M2 | OXYGEN SATURATION: 98 % | WEIGHT: 195.8 LBS

## 2025-05-28 DIAGNOSIS — K21.9 GASTROESOPHAGEAL REFLUX DISEASE, UNSPECIFIED WHETHER ESOPHAGITIS PRESENT: Primary | ICD-10-CM

## 2025-05-28 DIAGNOSIS — F32.89 OTHER DEPRESSION: ICD-10-CM

## 2025-05-28 PROCEDURE — 99214 OFFICE O/P EST MOD 30 MIN: CPT | Performed by: NURSE PRACTITIONER

## 2025-05-28 PROCEDURE — 3008F BODY MASS INDEX DOCD: CPT | Performed by: NURSE PRACTITIONER

## 2025-05-28 RX ORDER — CITALOPRAM 10 MG/1
10 TABLET ORAL DAILY
Qty: 90 TABLET | Refills: 3 | Status: SHIPPED | OUTPATIENT
Start: 2025-05-28 | End: 2026-05-28

## 2025-05-28 ASSESSMENT — ENCOUNTER SYMPTOMS
SHORTNESS OF BREATH: 0
WEAKNESS: 0
SINUS PAIN: 0
COUGH: 0
APPETITE CHANGE: 0
PALPITATIONS: 0
EYE DISCHARGE: 0
CONSTIPATION: 0
AGITATION: 0
ARTHRALGIAS: 0
NERVOUS/ANXIOUS: 0
BRUISES/BLEEDS EASILY: 0
DIARRHEA: 0
ABDOMINAL PAIN: 0
LOSS OF SENSATION IN FEET: 0
DEPRESSION: 0
HEMATURIA: 0
SORE THROAT: 0
ACTIVITY CHANGE: 0
TREMORS: 0
BLOOD IN STOOL: 0
DYSURIA: 0
WHEEZING: 0
JOINT SWELLING: 0
HEADACHES: 0
PHOTOPHOBIA: 0
OCCASIONAL FEELINGS OF UNSTEADINESS: 0
BACK PAIN: 0
ADENOPATHY: 0
DIZZINESS: 0

## 2025-05-28 ASSESSMENT — PAIN SCALES - GENERAL: PAINLEVEL_OUTOF10: 0-NO PAIN

## 2025-05-28 ASSESSMENT — PATIENT HEALTH QUESTIONNAIRE - PHQ9
2. FEELING DOWN, DEPRESSED OR HOPELESS: NOT AT ALL
1. LITTLE INTEREST OR PLEASURE IN DOING THINGS: NOT AT ALL
SUM OF ALL RESPONSES TO PHQ9 QUESTIONS 1 AND 2: 0

## 2025-05-28 ASSESSMENT — LIFESTYLE VARIABLES
AUDIT-C TOTAL SCORE: 1
HOW OFTEN DURING THE LAST YEAR HAVE YOU FOUND THAT YOU WERE NOT ABLE TO STOP DRINKING ONCE YOU HAD STARTED: NEVER
AUDIT TOTAL SCORE: 1
HOW OFTEN DO YOU HAVE SIX OR MORE DRINKS ON ONE OCCASION: NEVER
HOW OFTEN DURING THE LAST YEAR HAVE YOU BEEN UNABLE TO REMEMBER WHAT HAPPENED THE NIGHT BEFORE BECAUSE YOU HAD BEEN DRINKING: NEVER
HOW OFTEN DURING THE LAST YEAR HAVE YOU NEEDED AN ALCOHOLIC DRINK FIRST THING IN THE MORNING TO GET YOURSELF GOING AFTER A NIGHT OF HEAVY DRINKING: NEVER
HOW OFTEN DURING THE LAST YEAR HAVE YOU HAD A FEELING OF GUILT OR REMORSE AFTER DRINKING: NEVER
HAS A RELATIVE, FRIEND, DOCTOR, OR ANOTHER HEALTH PROFESSIONAL EXPRESSED CONCERN ABOUT YOUR DRINKING OR SUGGESTED YOU CUT DOWN: NO
HAVE YOU OR SOMEONE ELSE BEEN INJURED AS A RESULT OF YOUR DRINKING: NO
SKIP TO QUESTIONS 9-10: 1
HOW OFTEN DO YOU HAVE A DRINK CONTAINING ALCOHOL: MONTHLY OR LESS
HOW MANY STANDARD DRINKS CONTAINING ALCOHOL DO YOU HAVE ON A TYPICAL DAY: 1 OR 2
HOW OFTEN DURING THE LAST YEAR HAVE YOU FAILED TO DO WHAT WAS NORMALLY EXPECTED FROM YOU BECAUSE OF DRINKING: NEVER

## 2025-05-28 NOTE — PROGRESS NOTES
PCP: Doris Wilburn MD     Last appt: 7/28/2021   Future Appointments   Date Time Provider Peewee Varela   9/28/2021  9:45 [facility]   [facility]/2021 10:00 AM [facility]   11/11/2021  9:00 AM Vanessa Haas MD [facility]   11/11/2021  2:15 PM [facility]   1/28/2022  9:30 AM Che Marroquin MD [facility]        Requested Prescriptions     Pending Prescriptions Disp Refills    DULoxetine (CYMBALTA) 60 mg capsule 90 Capsule 1     Sig: Take 1 Capsule by mouth daily. Subjective   Patient ID: Autumn Hyde is a 44 y.o. female who presents for Follow-up.    Presents today for follow-up on depression and reflux.  She is seeing Dr. Russell for her reflux as she does have a colonoscopy and EGD and swallowing study scheduled in July.  She denies new concerns today.  She notes she is continue to follow with the program regarding substance use.  She denies any further substance use.  She denies chest pain or shortness of breath.  She denies headaches or dizziness.  Was recently in the ER for chest pain diagnosed as GERD.  Lab work reviewed.  Discussed results with patient.  * This note was partially generated using the Dragon Voice recognition system. There may be some incorrect wording, spelling and/or spelling errors or punctuation errors that were not corrected prior to committing the note to the medical record.*         Review of Systems   Constitutional:  Negative for activity change and appetite change.   HENT:  Negative for congestion, ear pain, hearing loss, sinus pain and sore throat.    Eyes:  Negative for photophobia, discharge and visual disturbance.   Respiratory:  Negative for cough, shortness of breath and wheezing.    Cardiovascular:  Negative for chest pain, palpitations and leg swelling.   Gastrointestinal:  Negative for abdominal pain, blood in stool, constipation and diarrhea.   Endocrine: Negative for cold intolerance, heat intolerance and polyuria.   Genitourinary:  Negative for dysuria and hematuria.   Musculoskeletal:  Negative for arthralgias, back pain and joint swelling.   Skin:  Negative for rash.   Allergic/Immunologic: Negative for environmental allergies and food allergies.   Neurological:  Negative for dizziness, tremors, syncope, weakness and headaches.   Hematological:  Negative for adenopathy. Does not bruise/bleed easily.   Psychiatric/Behavioral:  Negative for agitation and suicidal ideas. The patient is not nervous/anxious.        Objective  "  /76 (BP Location: Left arm, Patient Position: Sitting)   Pulse 73   Temp 36.7 °C (98.1 °F) (Temporal)   Ht 1.575 m (5' 2\")   Wt 88.8 kg (195 lb 12.8 oz)   SpO2 98%   BMI 35.81 kg/m²     Physical Exam  Vitals reviewed.   Constitutional:       General: She is not in acute distress.     Appearance: Normal appearance.   HENT:      Head: Normocephalic.      Right Ear: Tympanic membrane normal.      Left Ear: Tympanic membrane normal.      Nose: Nose normal.      Mouth/Throat:      Mouth: Mucous membranes are moist.   Eyes:      Conjunctiva/sclera: Conjunctivae normal.      Pupils: Pupils are equal, round, and reactive to light.   Cardiovascular:      Rate and Rhythm: Normal rate and regular rhythm.      Pulses: Normal pulses.      Heart sounds: Normal heart sounds.   Pulmonary:      Effort: Pulmonary effort is normal.      Breath sounds: Normal breath sounds.   Abdominal:      General: Bowel sounds are normal.      Palpations: Abdomen is soft.   Musculoskeletal:         General: Normal range of motion.   Skin:     General: Skin is warm and dry.      Capillary Refill: Capillary refill takes less than 2 seconds.   Neurological:      Mental Status: She is alert and oriented to person, place, and time.   Psychiatric:         Mood and Affect: Mood normal.         Speech: Speech normal.         Assessment/Plan   Problem List Items Addressed This Visit           ICD-10-CM    Depression F32.A    Relevant Medications    citalopram (CeleXA) 10 mg tablet    Other Relevant Orders    Follow Up In Primary Care - Established    GERD (gastroesophageal reflux disease) - Primary K21.9    Relevant Orders    Follow Up In Primary Care - Established          "

## 2025-06-04 PROCEDURE — RXMED WILLOW AMBULATORY MEDICATION CHARGE

## 2025-06-06 ENCOUNTER — PHARMACY VISIT (OUTPATIENT)
Dept: PHARMACY | Facility: CLINIC | Age: 45
End: 2025-06-06
Payer: COMMERCIAL

## 2025-06-06 ENCOUNTER — TELEMEDICINE (OUTPATIENT)
Dept: PRIMARY CARE | Facility: CLINIC | Age: 45
End: 2025-06-06
Payer: COMMERCIAL

## 2025-06-06 DIAGNOSIS — B96.89 ACUTE BACTERIAL RHINOSINUSITIS: Primary | ICD-10-CM

## 2025-06-06 DIAGNOSIS — J01.90 ACUTE BACTERIAL RHINOSINUSITIS: Primary | ICD-10-CM

## 2025-06-06 PROCEDURE — 99214 OFFICE O/P EST MOD 30 MIN: CPT | Performed by: NURSE PRACTITIONER

## 2025-06-06 PROCEDURE — RXMED WILLOW AMBULATORY MEDICATION CHARGE

## 2025-06-06 RX ORDER — FLUTICASONE PROPIONATE 50 MCG
1 SPRAY, SUSPENSION (ML) NASAL DAILY
Qty: 16 G | Refills: 1 | Status: SHIPPED | OUTPATIENT
Start: 2025-06-06 | End: 2026-06-06

## 2025-06-06 RX ORDER — AMOXICILLIN AND CLAVULANATE POTASSIUM 875; 125 MG/1; MG/1
875 TABLET, FILM COATED ORAL 2 TIMES DAILY
Qty: 20 TABLET | Refills: 0 | Status: SHIPPED | OUTPATIENT
Start: 2025-06-06 | End: 2025-06-16

## 2025-06-06 RX ORDER — ALBUTEROL SULFATE 90 UG/1
2 INHALANT RESPIRATORY (INHALATION) EVERY 6 HOURS PRN
Qty: 18 G | Refills: 3 | Status: SHIPPED | OUTPATIENT
Start: 2025-06-06

## 2025-06-06 ASSESSMENT — ENCOUNTER SYMPTOMS
SORE THROAT: 1
DIZZINESS: 0
ACTIVITY CHANGE: 0
SHORTNESS OF BREATH: 0
RHINORRHEA: 1
APPETITE CHANGE: 0
CONSTIPATION: 0
COUGH: 1
DIARRHEA: 0
TREMORS: 0
DYSURIA: 0
AGITATION: 0
PHOTOPHOBIA: 0
JOINT SWELLING: 0
WEAKNESS: 0
NERVOUS/ANXIOUS: 0
ABDOMINAL PAIN: 0
BRUISES/BLEEDS EASILY: 0
BLOOD IN STOOL: 0
WHEEZING: 0
ARTHRALGIAS: 0
BACK PAIN: 0
EYE DISCHARGE: 0
ADENOPATHY: 0
PALPITATIONS: 0
HEMATURIA: 0
HEADACHES: 0
SINUS PAIN: 1

## 2025-06-06 NOTE — PATIENT INSTRUCTIONS
Focus on healthy eating including lean proteins and vegetables.  Avoid high carbohydrate high sugar foods and drinks.  Try to increase physical activity as tolerated.  Goal is for 160 minutes/week of physical activity.  Start Augmentin , 2 times daily.  For 10 days.  Take until finished even if you feel better before then.   Call for worsening or not improving symptoms  Start Flonase one spray in each nostril once a day.  When administering point tip towards each ear and take in a slow deep breath after administering  Do not snuff the medication or point the tip straight back to avoid medication going down your throat

## 2025-06-06 NOTE — PROGRESS NOTES
Subjective   Patient ID: Autumn Hyde is a 44 y.o. female who presents for No chief complaint on file..    This visit was completed via audio and visual technology. All issues as below were discussed and addressed and a limited physical exam within the constraints of the technology was performed. If it was felt that the patient should be evaluated in clinic then they were directed there. Verbal consent was requested and obtained from parent/guardian to provide this telehealth service on this date for a telehealth visit.  I spent 10 minutes with patient and/or family, face to face and more than 50% of this time was spent in counseling and coordination of care.  Presents today via video visit with concerns for breath headache, congestion, sore throat, and productive cough of thick yellow secretions.  She reports symptoms have been present for about a week.  She denies chest pain.  She does admit to some shortness of breath.  She states her albuterol inhaler is managing the shortness of breath well.  She denies elevated temperature.           Review of Systems   Constitutional:  Negative for activity change and appetite change.   HENT:  Positive for congestion, ear pain, rhinorrhea, sinus pain and sore throat. Negative for hearing loss.    Eyes:  Negative for photophobia, discharge and visual disturbance.   Respiratory:  Positive for cough. Negative for shortness of breath and wheezing.    Cardiovascular:  Negative for chest pain, palpitations and leg swelling.   Gastrointestinal:  Negative for abdominal pain, blood in stool, constipation and diarrhea.   Endocrine: Negative for cold intolerance, heat intolerance and polyuria.   Genitourinary:  Negative for dysuria and hematuria.   Musculoskeletal:  Negative for arthralgias, back pain and joint swelling.   Skin:  Negative for rash.   Allergic/Immunologic: Negative for environmental allergies and food allergies.   Neurological:  Negative for dizziness, tremors,  syncope, weakness and headaches.   Hematological:  Negative for adenopathy. Does not bruise/bleed easily.   Psychiatric/Behavioral:  Negative for agitation and suicidal ideas. The patient is not nervous/anxious.        Objective   There were no vitals taken for this visit.    Physical Exam  HENT:      Head: Normocephalic.   Eyes:      Conjunctiva/sclera: Conjunctivae normal.   Pulmonary:      Effort: Pulmonary effort is normal.   Neurological:      General: No focal deficit present.      Mental Status: She is alert and oriented to person, place, and time.   Psychiatric:         Mood and Affect: Mood normal.         Assessment/Plan   Problem List Items Addressed This Visit    None  Visit Diagnoses         Codes      Acute bacterial rhinosinusitis    -  Primary J01.90, B96.89    Relevant Medications    fluticasone (Flonase) 50 mcg/actuation nasal spray    albuterol 90 mcg/actuation inhaler    amoxicillin-clavulanate (Augmentin) 875-125 mg tablet

## 2025-06-30 ENCOUNTER — PHARMACY VISIT (OUTPATIENT)
Dept: PHARMACY | Facility: CLINIC | Age: 45
End: 2025-06-30
Payer: COMMERCIAL

## 2025-06-30 PROCEDURE — RXMED WILLOW AMBULATORY MEDICATION CHARGE

## 2025-07-18 ENCOUNTER — TELEPHONE (OUTPATIENT)
Dept: PREADMISSION TESTING | Facility: HOSPITAL | Age: 45
End: 2025-07-18
Payer: COMMERCIAL

## 2025-07-18 NOTE — TELEPHONE ENCOUNTER
Pre-procedure PAT phone assessment completed. Pre-operative and medication instructions reviewed with patient. Patient verbalizes understanding of instructions.  SURGERY PRE-OPERATIVE INSTRUCTIONS    *You will receive a phone call the day before your procedure  after 2pm, (or the Friday before your surgery if scheduled on a Monday.) Generally the hospital will be calling you with this information after that time.    *If you are taking GLP1 medications for type 2 diabetes or weight loss, hold these medications on the day of the procedure. START a clear liquid diet 24 hours before your surgery. On the day of your surgery/procedure, STOP all clear liquids 2 hours before your arrival time to the hospital. A clear liquid diet consists of clear liquids and foods that melt into a clear liquid. Clear liquids can and should contain sugar. This is only if you are taking a GLP1 medication.     *You are not to eat after midnight the night before the surgery. You may have up to 10 ounces of clear liquids up until 2 hours prior to arriving to the hospital. The exception is with medications you were instructed to take day of surgery.     *You may take tylenol for pain/discomfort as needed.     *Stop taking all aspirin products, ibuprofen (motrin/advil), naproxen (aleve/naprosyn) for one week prior to surgery.    *Stop taking all vitamins and supplements one week prior to surgery.     *You should not have alcoholic beverages for 24 hours before surgery.     *You should not smoke 24 hours prior to surgery.     *To help prevent surgical infections bathe/shower with Dial soap the evening before surgery.    *You can wear deodorant but no lotion, powder, or perfume/cologne. You should remove all make-up and nail polish at home.    *If you wear glasses, please bring a case for the glasses with you.    *You will be asked to remove dentures and contacts.     *Please leave all valuables at home.    *You should wear loose, comfortable  clothing that will accommodate bandages and/or casts.    *You should notify your doctor of any change in your condition (fever, cold, rash, etc). Surgery may need to be re-scheduled until a time you are in better health.    *A responsible adult is required to accompany you to and from the hospital if you are receiving anesthesia or a sedative. Patients are not permitted to drive for 24 hours after anesthesia.     *You can use the [facility] parking if you wish.     *If you have any further questions please call [phone].

## 2025-07-21 ENCOUNTER — ANESTHESIA EVENT (OUTPATIENT)
Dept: OPERATING ROOM | Facility: HOSPITAL | Age: 45
End: 2025-07-21
Payer: COMMERCIAL

## 2025-07-21 RX ORDER — ONDANSETRON HYDROCHLORIDE 2 MG/ML
4 INJECTION, SOLUTION INTRAVENOUS ONCE AS NEEDED
Status: CANCELLED | OUTPATIENT
Start: 2025-07-21

## 2025-07-21 RX ORDER — DROPERIDOL 2.5 MG/ML
0.62 INJECTION, SOLUTION INTRAMUSCULAR; INTRAVENOUS ONCE AS NEEDED
Status: CANCELLED | OUTPATIENT
Start: 2025-07-21

## 2025-07-22 ENCOUNTER — ANESTHESIA (OUTPATIENT)
Dept: OPERATING ROOM | Facility: HOSPITAL | Age: 45
End: 2025-07-22
Payer: COMMERCIAL

## 2025-07-22 ENCOUNTER — HOSPITAL ENCOUNTER (OUTPATIENT)
Dept: OPERATING ROOM | Facility: HOSPITAL | Age: 45
Setting detail: OUTPATIENT SURGERY
Discharge: HOME | End: 2025-07-22
Payer: COMMERCIAL

## 2025-07-22 VITALS
OXYGEN SATURATION: 97 % | WEIGHT: 197.53 LBS | RESPIRATION RATE: 16 BRPM | HEIGHT: 63 IN | DIASTOLIC BLOOD PRESSURE: 69 MMHG | SYSTOLIC BLOOD PRESSURE: 125 MMHG | TEMPERATURE: 97 F | HEART RATE: 69 BPM | BODY MASS INDEX: 35 KG/M2

## 2025-07-22 VITALS
OXYGEN SATURATION: 97 % | SYSTOLIC BLOOD PRESSURE: 128 MMHG | DIASTOLIC BLOOD PRESSURE: 72 MMHG | HEART RATE: 52 BPM | RESPIRATION RATE: 20 BRPM

## 2025-07-22 DIAGNOSIS — R13.19 ESOPHAGEAL DYSPHAGIA: ICD-10-CM

## 2025-07-22 DIAGNOSIS — K21.9 GASTROESOPHAGEAL REFLUX DISEASE, UNSPECIFIED WHETHER ESOPHAGITIS PRESENT: ICD-10-CM

## 2025-07-22 DIAGNOSIS — R07.89 ATYPICAL CHEST PAIN: ICD-10-CM

## 2025-07-22 LAB — PREGNANCY TEST URINE, POC: NEGATIVE

## 2025-07-22 PROCEDURE — 7100000009 HC PHASE TWO TIME - INITIAL BASE CHARGE: Performed by: STUDENT IN AN ORGANIZED HEALTH CARE EDUCATION/TRAINING PROGRAM

## 2025-07-22 PROCEDURE — 2500000004 HC RX 250 GENERAL PHARMACY W/ HCPCS (ALT 636 FOR OP/ED): Mod: JW

## 2025-07-22 PROCEDURE — 3700000001 HC GENERAL ANESTHESIA TIME - INITIAL BASE CHARGE: Performed by: STUDENT IN AN ORGANIZED HEALTH CARE EDUCATION/TRAINING PROGRAM

## 2025-07-22 PROCEDURE — 91010 ESOPHAGUS MOTILITY STUDY: CPT | Performed by: SURGERY

## 2025-07-22 PROCEDURE — 3600000007 HC OR TIME - EACH INCREMENTAL 1 MINUTE - PROCEDURE LEVEL TWO: Performed by: STUDENT IN AN ORGANIZED HEALTH CARE EDUCATION/TRAINING PROGRAM

## 2025-07-22 PROCEDURE — 3700000002 HC GENERAL ANESTHESIA TIME - EACH INCREMENTAL 1 MINUTE: Performed by: STUDENT IN AN ORGANIZED HEALTH CARE EDUCATION/TRAINING PROGRAM

## 2025-07-22 PROCEDURE — 91010 ESOPHAGUS MOTILITY STUDY: CPT

## 2025-07-22 PROCEDURE — 2500000005 HC RX 250 GENERAL PHARMACY W/O HCPCS: Performed by: SURGERY

## 2025-07-22 PROCEDURE — 7100000010 HC PHASE TWO TIME - EACH INCREMENTAL 1 MINUTE: Performed by: STUDENT IN AN ORGANIZED HEALTH CARE EDUCATION/TRAINING PROGRAM

## 2025-07-22 PROCEDURE — 2500000004 HC RX 250 GENERAL PHARMACY W/ HCPCS (ALT 636 FOR OP/ED): Performed by: ANESTHESIOLOGY

## 2025-07-22 PROCEDURE — 81025 URINE PREGNANCY TEST: CPT | Performed by: ANESTHESIOLOGY

## 2025-07-22 PROCEDURE — 43235 EGD DIAGNOSTIC BRUSH WASH: CPT | Performed by: SURGERY

## 2025-07-22 PROCEDURE — 3600000002 HC OR TIME - INITIAL BASE CHARGE - PROCEDURE LEVEL TWO: Performed by: STUDENT IN AN ORGANIZED HEALTH CARE EDUCATION/TRAINING PROGRAM

## 2025-07-22 RX ORDER — LIDOCAINE HCL/PF 100 MG/5ML
SYRINGE (ML) INTRAVENOUS AS NEEDED
Status: DISCONTINUED | OUTPATIENT
Start: 2025-07-22 | End: 2025-07-22

## 2025-07-22 RX ORDER — PROPOFOL 10 MG/ML
INJECTION, EMULSION INTRAVENOUS AS NEEDED
Status: DISCONTINUED | OUTPATIENT
Start: 2025-07-22 | End: 2025-07-22

## 2025-07-22 RX ORDER — LIDOCAINE HYDROCHLORIDE 20 MG/ML
5 JELLY TOPICAL ONCE
Status: COMPLETED | OUTPATIENT
Start: 2025-07-22 | End: 2025-07-22

## 2025-07-22 RX ORDER — SODIUM CHLORIDE, SODIUM LACTATE, POTASSIUM CHLORIDE, CALCIUM CHLORIDE 600; 310; 30; 20 MG/100ML; MG/100ML; MG/100ML; MG/100ML
20 INJECTION, SOLUTION INTRAVENOUS CONTINUOUS
Status: DISCONTINUED | OUTPATIENT
Start: 2025-07-22 | End: 2025-07-23 | Stop reason: HOSPADM

## 2025-07-22 RX ADMIN — PROPOFOL 50 MG: 10 INJECTION, EMULSION INTRAVENOUS at 08:13

## 2025-07-22 RX ADMIN — PROPOFOL 50 MG: 10 INJECTION, EMULSION INTRAVENOUS at 08:20

## 2025-07-22 RX ADMIN — PROPOFOL 50 MG: 10 INJECTION, EMULSION INTRAVENOUS at 08:15

## 2025-07-22 RX ADMIN — SODIUM CHLORIDE, SODIUM LACTATE, POTASSIUM CHLORIDE, AND CALCIUM CHLORIDE 20 ML/HR: .6; .31; .03; .02 INJECTION, SOLUTION INTRAVENOUS at 07:38

## 2025-07-22 RX ADMIN — LIDOCAINE HYDROCHLORIDE 1 APPLICATION: 20 JELLY TOPICAL at 08:12

## 2025-07-22 RX ADMIN — PROPOFOL 50 MG: 10 INJECTION, EMULSION INTRAVENOUS at 08:17

## 2025-07-22 RX ADMIN — PROPOFOL 50 MG: 10 INJECTION, EMULSION INTRAVENOUS at 08:18

## 2025-07-22 RX ADMIN — LIDOCAINE HYDROCHLORIDE 60 MG: 20 INJECTION INTRAVENOUS at 08:13

## 2025-07-22 SDOH — HEALTH STABILITY: MENTAL HEALTH: CURRENT SMOKER: 0

## 2025-07-22 ASSESSMENT — COLUMBIA-SUICIDE SEVERITY RATING SCALE - C-SSRS
6. HAVE YOU EVER DONE ANYTHING, STARTED TO DO ANYTHING, OR PREPARED TO DO ANYTHING TO END YOUR LIFE?: NO
1. IN THE PAST MONTH, HAVE YOU WISHED YOU WERE DEAD OR WISHED YOU COULD GO TO SLEEP AND NOT WAKE UP?: NO
2. HAVE YOU ACTUALLY HAD ANY THOUGHTS OF KILLING YOURSELF?: NO

## 2025-07-22 ASSESSMENT — PAIN - FUNCTIONAL ASSESSMENT
PAIN_FUNCTIONAL_ASSESSMENT: 0-10

## 2025-07-22 ASSESSMENT — PAIN SCALES - GENERAL
PAINLEVEL_OUTOF10: 0 - NO PAIN
PAIN_LEVEL: 0
PAINLEVEL_OUTOF10: 0 - NO PAIN
PAINLEVEL_OUTOF10: 0 - NO PAIN

## 2025-07-22 NOTE — H&P
Bariatric/General Surgery H&P    7/22/2025 8:07 AM  Autumn Hyde  [id number]    Autumn Hyde is a 45 y.o. year old female with esophageal dysphagia and known hiatal hernia.     PAST MEDICAL HISTORY:  Medical History[1]     PAST SURGICAL HISTORY:  Surgical History[2]    FAMILY HISTORY:  Family History[3]     SOCIAL HISTORY:  Social History[4]    MEDICATIONS:  Prior to Admission Medications:  [facility]    ALLERGIES:  Allergies[5]    REVIEW OF SYSTEMS:  GENERAL: Negative for malaise, significant weight loss and fever  NECK: Negative for lumps, goiter, pain and significant neck swelling  RESPIRATORY: Negative for cough, wheezing or shortness of breath.  CARDIOVASCULAR: Negative for chest pain, leg swelling or palpitations.  GI: Negative for abdominal discomfort, blood in stools or black stools or change in bowel habits  : No history of dysuria, frequency or incontinence  MUSCULOSKELETAL: Negative for joint pain or swelling, back pain or muscle pain.  SKIN: Negative for lesions, rash, and itching.  PSYCH: Negative for sleep disturbance, mood disorder and recent psychosocial stressors.  ENDOCRINE: Negative for cold or heat intolerance, polyuria, polydipsia and goiter.    PHYSICAL EXAM:  Vitals:    07/22/25 0724   BP: 111/78   Pulse: 65   Resp: 16   Temp: 35.9 °C (96.6 °F)   SpO2: 98%     General appearance: obese  Skin: warm, no erythema or rashes  Lungs: clear to percussion and auscultation  Heart: regular rhythm and S1, S2 normal  Abdomen: soft, non-tender, no masses, no organomegaly  Extremities: Normal exam of the extremities. No swelling or pain.    IMPRESSION:  Autumn Hyde is a 45 y.o. female with dysphagia.    PLAN:  Endoscopy, placement of esophageal motility catheter today.    The risks of the procedure including bleeding, perforation, need for further procedure and death have been explained to the patient and Autumn Hyde has expressed understanding and acceptance of them. Consent has  been signed.    Rudolph Pak MD  Bariatric and Minimally Invasive General Surgery         [1]   Past Medical History:  Diagnosis Date    Acute depression     Acute sinusitis 05/10/2023    Acute upper respiratory infection, unspecified 2015    Viral URI    Chlamydial vulvovaginitis 2020    Chlamydia vaginitis/cervicitis    Chronic low back pain 05/10/2023    CTS (carpal tunnel syndrome)     Depression     Diarrhea 05/10/2023    GERD (gastroesophageal reflux disease)     Gonorrhea in female 2020    History of abdominal pain 2020    History of fatigue 2017    History of gonorrhea 2020    History of wheezing 2020    Hypothyroidism     Migraines     OAB (overactive bladder)     Obesity, Class II, BMI 35-39.9 04/15/2019    Pain in both feet 05/10/2023    Pelvic and perineal pain     Vaginal pain    Sprain of ankle, right 05/10/2023    Stress incontinence     Ulceration of vulva 2020    Vulvar ulcer    Umbilical hernia 05/10/2023    Urinary frequency 05/10/2023    Urinary tract infection, site not specified 2020    Acute urinary tract infection    Wears glasses    [2]   Past Surgical History:  Procedure Laterality Date    ABDOMINAL AORTIC ANEURYSM REPAIR  2016    Abd Aorta Aneurysm Repair 2 Dock Limbs W/ Visc Extens Prosth    HERNIA REPAIR  10/15/2020    TUBAL LIGATION  2016    Tubal Ligation    WISDOM TOOTH EXTRACTION     [3]   Family History  Problem Relation Name Age of Onset    Lung cancer Father      Breast cancer Maternal Grandmother zeus gay 60 - 69   [4]   Social History  Tobacco Use    Smoking status: Former     Current packs/day: 0.00     Types: Cigarettes     Quit date:      Years since quittin.5    Smokeless tobacco: Current    Tobacco comments:     Vapes nicotine   Vaping Use    Vaping status: Never Used   Substance Use Topics    Alcohol use: Yes     Comment: occasionally    Drug use: Never   [5] No Known Allergies

## 2025-07-22 NOTE — ANESTHESIA POSTPROCEDURE EVALUATION
Patient: Autumn Hyde    Procedure Summary       Date: 07/22/25 Room / Location: [facility] OR    Anesthesia Start: 0809 Anesthesia Stop: 0826    Procedure: EGD Diagnosis:       Gastroesophageal reflux disease, unspecified whether esophagitis present      Esophageal dysphagia    Scheduled Providers: Rudolph Pak MD; Aris Brand MD Responsible Provider: Aris Brand MD    Anesthesia Type: MAC ASA Status: 2            Anesthesia Type: MAC    Vitals Value Taken Time   /69 07/22/25 08:50   Temp 36.1 °C (97 °F) 07/22/25 08:25   Pulse 69 07/22/25 08:50   Resp 16 07/22/25 08:50   SpO2 97 % 07/22/25 08:50       Anesthesia Post Evaluation    Patient location during evaluation: PACU  Patient participation: complete - patient participated  Level of consciousness: sleepy but conscious  Pain score: 0  Pain management: adequate  Multimodal analgesia pain management approach  Airway patency: patent  Two or more strategies used to mitigate risk of obstructive sleep apnea  Cardiovascular status: acceptable and stable  Respiratory status: acceptable, room air, airway suctioned and nonlabored ventilation  Hydration status: acceptable  Postoperative Nausea and Vomiting: none        No notable events documented.

## 2025-07-22 NOTE — ANESTHESIA PREPROCEDURE EVALUATION
Patient: Autumn Hyde    Procedure Information       Date/Time: 07/22/25 0800    Scheduled providers: Rudolph Pak MD; Aris Brand MD    Procedure: EGD    Location: [facility] OR            Relevant Problems   Cardiac   (+) Atypical chest pain      Neuro   (+) Carpal tunnel syndrome on left   (+) Depression      GI   (+) Dysphagia   (+) GERD (gastroesophageal reflux disease)      Endocrine   (+) Hypothyroidism      Musculoskeletal   (+) Carpal tunnel syndrome on left      ID   (+) Recurrent genital herpes       Clinical information reviewed:                   NPO Detail:  No data recorded     Physical Exam    Airway  Mallampati: II  TM distance: >3 FB  Neck ROM: full  Mouth opening: 3 or more finger widths     Cardiovascular - normal exam   Dental - normal exam     Pulmonary - normal exam   Abdominal - normal exam           Anesthesia Plan    History of general anesthesia?: yes  History of complications of general anesthesia?: no    ASA 2     MAC     The patient is not a current smoker.  Patient did not smoke on day of procedure.  Education provided regarding risk of obstructive sleep apnea.  intravenous induction   Anesthetic plan and risks discussed with patient.    Plan discussed with CRNA.

## 2025-07-22 NOTE — NURSING NOTE
Catheter placed  under endoscopic visualization. All swallows completed without  difficulty. Catheter removed intact. Patient voices no concern at present. Discharged to home.

## 2025-07-25 PROCEDURE — 91038 ESOPH IMPED FUNCT TEST > 1HR: CPT | Performed by: SURGERY

## 2025-08-05 PROCEDURE — RXMED WILLOW AMBULATORY MEDICATION CHARGE

## 2025-08-06 ENCOUNTER — PHARMACY VISIT (OUTPATIENT)
Dept: PHARMACY | Facility: CLINIC | Age: 45
End: 2025-08-06
Payer: COMMERCIAL

## 2025-08-20 ENCOUNTER — APPOINTMENT (OUTPATIENT)
Facility: CLINIC | Age: 45
End: 2025-08-20
Payer: COMMERCIAL

## 2025-08-20 DIAGNOSIS — R13.19 ESOPHAGEAL DYSPHAGIA: ICD-10-CM

## 2025-08-20 DIAGNOSIS — K21.9 GASTROESOPHAGEAL REFLUX DISEASE, UNSPECIFIED WHETHER ESOPHAGITIS PRESENT: ICD-10-CM

## 2025-08-20 PROCEDURE — 99212 OFFICE O/P EST SF 10 MIN: CPT | Performed by: STUDENT IN AN ORGANIZED HEALTH CARE EDUCATION/TRAINING PROGRAM

## 2025-08-20 RX ORDER — PANTOPRAZOLE SODIUM 20 MG/1
40 TABLET, DELAYED RELEASE ORAL DAILY
Qty: 60 TABLET | Refills: 11 | Status: SHIPPED | OUTPATIENT
Start: 2025-08-20 | End: 2026-08-20

## 2025-08-20 ASSESSMENT — ENCOUNTER SYMPTOMS
RECTAL PAIN: 0
FEVER: 0
NAUSEA: 0
DIARRHEA: 0
BLOOD IN STOOL: 0
SHORTNESS OF BREATH: 0
CHILLS: 0
CONSTIPATION: 0
ANAL BLEEDING: 0
VOMITING: 0
TROUBLE SWALLOWING: 0
ABDOMINAL PAIN: 0
COLOR CHANGE: 0
ABDOMINAL DISTENTION: 0
UNEXPECTED WEIGHT CHANGE: 0

## 2025-11-28 ENCOUNTER — APPOINTMENT (OUTPATIENT)
Dept: PRIMARY CARE | Facility: CLINIC | Age: 45
End: 2025-11-28
Payer: COMMERCIAL

## (undated) DEVICE — CAUTERY, PENCIL, PUSH BUTTON, SMOKE EVAC, 70MM

## (undated) DEVICE — IRRIGATION SET, CYSTOSCOPY, TURP, Y, CONTINUOUS, 81 IN

## (undated) DEVICE — SUTURE, MONOCRYL, 4-0, 18 IN, PS2, UNDYED

## (undated) DEVICE — PAD, SANITARY, OBSTETRICAL, W/ADHESIVE STRIP, WING, 11 IN, NS

## (undated) DEVICE — NEEDLE, SAFETY, 22 G X 1.5 IN

## (undated) DEVICE — SUTURE, VICRYL, 0, 27 IN, CT-2, UNDYED

## (undated) DEVICE — Device

## (undated) DEVICE — PREP TRAY, SKIN, DRY, W/GLOVES

## (undated) DEVICE — RETRACTOR, SURGICAL, RING, PLASTIC, DISPOSABLE

## (undated) DEVICE — TIP, SUCTION, YANKAUER, FLEXIBLE

## (undated) DEVICE — TRAY, FOLEY, ADVANCE, 16FR, SILICONE, W/STATLOCK

## (undated) DEVICE — DRAPE PACK, LAVH, W/ATTACHED LEGGINGS, W/POUCH, 100 X 114 IN, LF, STERILE

## (undated) DEVICE — DRAPE PACK, MINOR, CUSTOM, GEAUGA

## (undated) DEVICE — SLING, DESARA, BLUE TV, WITH 2.7 INTRODUCER

## (undated) DEVICE — STOCKINETTE, TUBE, BLN, ST, 1 PLY, 4 X 48 IN, LF

## (undated) DEVICE — SOLUTION, IRRIGATION, SODIUM CHLORIDE 0.9%, 1000 ML, POUR BOTTLE

## (undated) DEVICE — TISSUE ADHESIVE, PREMIERPRO EXOFIN, PRECISION PEN HV, 1.0ML

## (undated) DEVICE — CUFF, TOURNIQUET, 18 X 4, SNGL PORT/SNGL BLADDER, DISP, LF

## (undated) DEVICE — STAY SET, SURGICAL , 5MM SHARP HOOK, CS/ 50

## (undated) DEVICE — SYRINGE, HYPODERMIC, CONTROL, LUER LOCK, 10 CC, PLASTIC, STERILE

## (undated) DEVICE — TUBING, SUCTION, CONNECTING, NON-CONDUCTIVE, SURE GRIP CONNECTORS, 3/16 IN X 10 FT